# Patient Record
Sex: FEMALE | Race: OTHER | NOT HISPANIC OR LATINO | URBAN - METROPOLITAN AREA
[De-identification: names, ages, dates, MRNs, and addresses within clinical notes are randomized per-mention and may not be internally consistent; named-entity substitution may affect disease eponyms.]

---

## 2020-05-02 ENCOUNTER — EMERGENCY (EMERGENCY)
Facility: HOSPITAL | Age: 40
LOS: 1 days | Discharge: HOME | End: 2020-05-02
Attending: EMERGENCY MEDICINE
Payer: MEDICAID

## 2020-05-02 VITALS
HEART RATE: 80 BPM | OXYGEN SATURATION: 98 % | WEIGHT: 175.05 LBS | DIASTOLIC BLOOD PRESSURE: 74 MMHG | HEIGHT: 64 IN | RESPIRATION RATE: 16 BRPM | TEMPERATURE: 99 F | SYSTOLIC BLOOD PRESSURE: 118 MMHG

## 2020-05-02 VITALS
SYSTOLIC BLOOD PRESSURE: 129 MMHG | DIASTOLIC BLOOD PRESSURE: 73 MMHG | RESPIRATION RATE: 18 BRPM | OXYGEN SATURATION: 98 % | HEART RATE: 83 BPM

## 2020-05-02 LAB
APPEARANCE UR: CLEAR — SIGNIFICANT CHANGE UP
BILIRUB UR-MCNC: NEGATIVE — SIGNIFICANT CHANGE UP
BLD GP AB SCN SERPL QL: NEGATIVE — SIGNIFICANT CHANGE UP
COLOR SPEC: SIGNIFICANT CHANGE UP
DIFF PNL FLD: NEGATIVE — SIGNIFICANT CHANGE UP
GLUCOSE UR QL: NEGATIVE — SIGNIFICANT CHANGE UP
HCG SERPL-ACNC: HIGH MIU/ML
KETONES UR-MCNC: NEGATIVE — SIGNIFICANT CHANGE UP
LEUKOCYTE ESTERASE UR-ACNC: NEGATIVE — SIGNIFICANT CHANGE UP
NITRITE UR-MCNC: NEGATIVE — SIGNIFICANT CHANGE UP
PH UR: 7 — SIGNIFICANT CHANGE UP (ref 5–8)
PROT UR-MCNC: SIGNIFICANT CHANGE UP
RH IG SCN BLD-IMP: POSITIVE — SIGNIFICANT CHANGE UP
SP GR SPEC: 1.02 — SIGNIFICANT CHANGE UP (ref 1.01–1.02)
UROBILINOGEN FLD QL: NEGATIVE — SIGNIFICANT CHANGE UP

## 2020-05-02 PROCEDURE — 81003 URINALYSIS AUTO W/O SCOPE: CPT

## 2020-05-02 PROCEDURE — 87086 URINE CULTURE/COLONY COUNT: CPT

## 2020-05-02 PROCEDURE — 86850 RBC ANTIBODY SCREEN: CPT

## 2020-05-02 PROCEDURE — 99285 EMERGENCY DEPT VISIT HI MDM: CPT

## 2020-05-02 PROCEDURE — 84702 CHORIONIC GONADOTROPIN TEST: CPT

## 2020-05-02 PROCEDURE — 86901 BLOOD TYPING SEROLOGIC RH(D): CPT

## 2020-05-02 PROCEDURE — 36415 COLL VENOUS BLD VENIPUNCTURE: CPT

## 2020-05-02 PROCEDURE — 76815 OB US LIMITED FETUS(S): CPT

## 2020-05-02 PROCEDURE — 86900 BLOOD TYPING SEROLOGIC ABO: CPT

## 2020-05-02 PROCEDURE — 99284 EMERGENCY DEPT VISIT MOD MDM: CPT

## 2020-05-02 PROCEDURE — 76815 OB US LIMITED FETUS(S): CPT | Mod: 26

## 2020-05-02 RX ORDER — THIAMINE MONONITRATE (VIT B1) 100 MG
100 TABLET ORAL ONCE
Refills: 0 | Status: COMPLETED | OUTPATIENT
Start: 2020-05-02 | End: 2020-05-02

## 2020-05-02 RX ORDER — FOLIC ACID 0.8 MG
1 TABLET ORAL ONCE
Refills: 0 | Status: COMPLETED | OUTPATIENT
Start: 2020-05-02 | End: 2020-05-02

## 2020-05-02 RX ADMIN — Medication 100 MILLIGRAM(S): at 17:40

## 2020-05-02 RX ADMIN — Medication 1 MILLIGRAM(S): at 17:40

## 2020-05-02 NOTE — ED PROVIDER NOTE - NSFOLLOWUPCLINICS_GEN_ALL_ED_FT
[facility]  Gynceology/OB  [address] [place] [address]  Phone: [phone]  Fax:   Follow Up Time:

## 2020-05-02 NOTE — ED PROCEDURE NOTE - PROCEDURE ADDITIONAL DETAILS
Emergency Department Focused Ultrasound performed at patient's bedside.  The complete report can be found in PACS. d

## 2020-05-02 NOTE — ED PROVIDER NOTE - CLINICAL SUMMARY MEDICAL DECISION MAKING FREE TEXT BOX
Tristan: 40 year old female   12 weeks pregnant with vaginal spotting since yesterday. will get labs, u/s, type and screen. patient declined pelvic exam at this time. has f/u appointmetn on monday with obgyn.

## 2020-05-02 NOTE — ED PROVIDER NOTE - PATIENT PORTAL LINK FT
You can access the FollowMyHealth Patient Portal offered by [facility] by registering at the following website: [email or web address]. By joining [facility]’s FollowMyHealth portal, you will also be able to view your health information using other applications (apps) compatible with our system.

## 2020-05-02 NOTE — ED PROVIDER NOTE - NSFOLLOWUPINSTRUCTIONS_ED_ALL_ED_FT
1- Tylenol as needed for pain  2- Start prenatal vitamin daily  3- Follow up with our OBGYN clinic   4- If you have any worsening bleeding. pain, or any other concerns come back to the ER immediately

## 2020-05-02 NOTE — ED PROVIDER NOTE - OBJECTIVE STATEMENT
40 y.o. female  12 weeks pregnant coming in with vaginal bleeding that started yesterday.  Some lower abd cramping but not currently in any pain.  No fevers, no chills.  + passage of some clot material yesterday but that has since resolved.  Not lightheaded or dizzy.  No urinary complaints.  Nothing makes the bleeding better or worse.

## 2020-05-02 NOTE — ED ADULT NURSE NOTE - OBJECTIVE STATEMENT
39y/o F 12weeks pregnant coming to the ED c/o vaginal bleeding. Pt states that starting yesterday pt noticed blood when she wiped after urinating. Pt states that she had a another episode and additional clotts/discharge. Pt states that she had some lower abdominal cramps that "felt like period cramps" but no longer is experiencing them. Pt states that she has her first OBGYN appt on monday but had an US last week. Pt denies any other urinary c/o. Pt denies any CP/SOB/Fever/Chills/N/V/D. IV placed. Labs & urine collected and sent. US done @ bedside. VS stable. Pt given call bell and educated on how to use.

## 2020-05-02 NOTE — ED PROVIDER NOTE - CARE PLAN
Principal Discharge DX:	Vaginal bleeding affecting early pregnancy Principal Discharge DX:	Threatened

## 2020-05-03 LAB
CULTURE RESULTS: SIGNIFICANT CHANGE UP
SPECIMEN SOURCE: SIGNIFICANT CHANGE UP

## 2020-06-26 ENCOUNTER — ASOB RESULT (OUTPATIENT)
Age: 40
End: 2020-06-26

## 2020-06-26 ENCOUNTER — APPOINTMENT (OUTPATIENT)
Dept: ANTEPARTUM | Facility: CLINIC | Age: 40
End: 2020-06-26
Payer: MEDICAID

## 2020-06-26 PROCEDURE — 99201 OFFICE OUTPATIENT NEW 10 MINUTES: CPT | Mod: 25

## 2020-06-26 PROCEDURE — 76811 OB US DETAILED SNGL FETUS: CPT

## 2020-07-06 PROBLEM — Z00.00 ENCOUNTER FOR PREVENTIVE HEALTH EXAMINATION: Status: ACTIVE | Noted: 2020-07-06

## 2020-07-07 ENCOUNTER — APPOINTMENT (OUTPATIENT)
Dept: PEDIATRIC CARDIOLOGY | Facility: CLINIC | Age: 40
End: 2020-07-07
Payer: MEDICAID

## 2020-07-07 PROCEDURE — 76827 ECHO EXAM OF FETAL HEART: CPT

## 2020-07-07 PROCEDURE — 76825 ECHO EXAM OF FETAL HEART: CPT

## 2020-07-07 PROCEDURE — 93325 DOPPLER ECHO COLOR FLOW MAPG: CPT

## 2020-07-07 PROCEDURE — 99201 OFFICE OUTPATIENT NEW 10 MINUTES: CPT | Mod: 25

## 2020-08-21 ENCOUNTER — ASOB RESULT (OUTPATIENT)
Age: 40
End: 2020-08-21

## 2020-08-21 ENCOUNTER — APPOINTMENT (OUTPATIENT)
Dept: ANTEPARTUM | Facility: CLINIC | Age: 40
End: 2020-08-21
Payer: MEDICAID

## 2020-08-21 PROCEDURE — 76819 FETAL BIOPHYS PROFIL W/O NST: CPT

## 2020-08-21 PROCEDURE — 76816 OB US FOLLOW-UP PER FETUS: CPT

## 2020-09-01 ENCOUNTER — APPOINTMENT (OUTPATIENT)
Dept: PEDIATRIC CARDIOLOGY | Facility: CLINIC | Age: 40
End: 2020-09-01
Payer: MEDICAID

## 2020-09-01 PROCEDURE — 93325 DOPPLER ECHO COLOR FLOW MAPG: CPT

## 2020-09-01 PROCEDURE — 76828 ECHO EXAM OF FETAL HEART: CPT

## 2020-09-01 PROCEDURE — 76826 ECHO EXAM OF FETAL HEART: CPT

## 2020-09-01 PROCEDURE — 99213 OFFICE O/P EST LOW 20 MIN: CPT | Mod: 25

## 2020-09-17 ENCOUNTER — APPOINTMENT (OUTPATIENT)
Dept: ANTEPARTUM | Facility: HOSPITAL | Age: 40
End: 2020-09-17

## 2020-09-17 ENCOUNTER — ASOB RESULT (OUTPATIENT)
Age: 40
End: 2020-09-17

## 2020-09-17 ENCOUNTER — APPOINTMENT (OUTPATIENT)
Dept: ANTEPARTUM | Facility: CLINIC | Age: 40
End: 2020-09-17
Payer: MEDICAID

## 2020-09-17 PROCEDURE — 76816 OB US FOLLOW-UP PER FETUS: CPT

## 2020-09-17 PROCEDURE — 76819 FETAL BIOPHYS PROFIL W/O NST: CPT

## 2020-10-15 ENCOUNTER — OUTPATIENT (OUTPATIENT)
Dept: OUTPATIENT SERVICES | Facility: HOSPITAL | Age: 40
LOS: 1 days | End: 2020-10-15

## 2020-10-15 ENCOUNTER — ASOB RESULT (OUTPATIENT)
Age: 40
End: 2020-10-15

## 2020-10-15 ENCOUNTER — APPOINTMENT (OUTPATIENT)
Dept: ANTEPARTUM | Facility: CLINIC | Age: 40
End: 2020-10-15
Payer: MEDICAID

## 2020-10-15 PROCEDURE — 76818 FETAL BIOPHYS PROFILE W/NST: CPT | Mod: 26

## 2020-10-15 PROCEDURE — 76816 OB US FOLLOW-UP PER FETUS: CPT

## 2020-10-28 ENCOUNTER — OUTPATIENT (OUTPATIENT)
Dept: OUTPATIENT SERVICES | Facility: HOSPITAL | Age: 40
LOS: 1 days | End: 2020-10-28

## 2020-10-28 VITALS
HEIGHT: 64 IN | TEMPERATURE: 97 F | HEART RATE: 84 BPM | DIASTOLIC BLOOD PRESSURE: 73 MMHG | RESPIRATION RATE: 16 BRPM | SYSTOLIC BLOOD PRESSURE: 120 MMHG | OXYGEN SATURATION: 99 % | WEIGHT: 203.93 LBS

## 2020-10-28 DIAGNOSIS — Z30.9 ENCOUNTER FOR CONTRACEPTIVE MANAGEMENT, UNSPECIFIED: ICD-10-CM

## 2020-10-28 DIAGNOSIS — Z34.90 ENCOUNTER FOR SUPERVISION OF NORMAL PREGNANCY, UNSPECIFIED, UNSPECIFIED TRIMESTER: ICD-10-CM

## 2020-10-28 LAB
APPEARANCE UR: SIGNIFICANT CHANGE UP
BACTERIA # UR AUTO: NEGATIVE — SIGNIFICANT CHANGE UP
BILIRUB UR-MCNC: NEGATIVE — SIGNIFICANT CHANGE UP
BLD GP AB SCN SERPL QL: NEGATIVE — SIGNIFICANT CHANGE UP
BLOOD UR QL VISUAL: NEGATIVE — SIGNIFICANT CHANGE UP
COLOR SPEC: YELLOW — SIGNIFICANT CHANGE UP
GLUCOSE UR-MCNC: NEGATIVE — SIGNIFICANT CHANGE UP
HCT VFR BLD CALC: 37.8 % — SIGNIFICANT CHANGE UP (ref 34.5–45)
HGB BLD-MCNC: 12 G/DL — SIGNIFICANT CHANGE UP (ref 11.5–15.5)
HYALINE CASTS # UR AUTO: NEGATIVE — SIGNIFICANT CHANGE UP
KETONES UR-MCNC: NEGATIVE — SIGNIFICANT CHANGE UP
LEUKOCYTE ESTERASE UR-ACNC: NEGATIVE — SIGNIFICANT CHANGE UP
MCHC RBC-ENTMCNC: 27.4 PG — SIGNIFICANT CHANGE UP (ref 27–34)
MCHC RBC-ENTMCNC: 31.7 % — LOW (ref 32–36)
MCV RBC AUTO: 86.3 FL — SIGNIFICANT CHANGE UP (ref 80–100)
NITRITE UR-MCNC: NEGATIVE — SIGNIFICANT CHANGE UP
NRBC # FLD: 0 K/UL — SIGNIFICANT CHANGE UP (ref 0–0)
PH UR: 7 — SIGNIFICANT CHANGE UP (ref 5–8)
PLATELET # BLD AUTO: 183 K/UL — SIGNIFICANT CHANGE UP (ref 150–400)
PMV BLD: 10.7 FL — SIGNIFICANT CHANGE UP (ref 7–13)
PROT UR-MCNC: 10 — SIGNIFICANT CHANGE UP
RBC # BLD: 4.38 M/UL — SIGNIFICANT CHANGE UP (ref 3.8–5.2)
RBC # FLD: 15 % — HIGH (ref 10.3–14.5)
RBC CASTS # UR COMP ASSIST: SIGNIFICANT CHANGE UP (ref 0–?)
RH IG SCN BLD-IMP: POSITIVE — SIGNIFICANT CHANGE UP
SP GR SPEC: 1.02 — SIGNIFICANT CHANGE UP (ref 1–1.04)
SQUAMOUS # UR AUTO: SIGNIFICANT CHANGE UP
UROBILINOGEN FLD QL: NORMAL — SIGNIFICANT CHANGE UP
WBC # BLD: 8.88 K/UL — SIGNIFICANT CHANGE UP (ref 3.8–10.5)
WBC # FLD AUTO: 8.88 K/UL — SIGNIFICANT CHANGE UP (ref 3.8–10.5)
WBC UR QL: SIGNIFICANT CHANGE UP (ref 0–?)

## 2020-10-28 NOTE — OB PST NOTE - PROBLEM SELECTOR PLAN 1
scheduled for repeat  section, bilateral tubal ligation with Dr. Alegria on 2020.  Preoperative instructions reviewed. Surgical scrub provided. No food after midnight, clear liquids up to 2.5 hours prior to procedure.. Pending labs Type & screen, CBC, UA.

## 2020-10-28 NOTE — OB PST NOTE - NSANTHOSAYNRD_GEN_A_CORE
No. BAILEE screening performed.  STOP BANG Legend: 0-2 = LOW Risk; 3-4 = INTERMEDIATE Risk; 5-8 = HIGH Risk

## 2020-10-28 NOTE — OB PST NOTE - ASSESSMENT
41 yo female with no significant pmh presents to PST unit scheduled for repeat  section, bilateral tubal ligation with Dr. Alegria. REGINO 2020.

## 2020-10-28 NOTE — OB PST NOTE - VISION (WITH CORRECTIVE LENSES IF THE PATIENT USUALLY WEARS THEM):
[FreeTextEntry1] : Ms Prasad is a 52 Normal vision: sees adequately in most situations; can see medication labels, newsprint

## 2020-10-28 NOTE — OB PST NOTE - NSHPREVIEWOFSYSTEMS_GEN_ALL_CORE
General: No fever, chills, sweating, anorexia, weight loss or weight gain. No polyphagia, polyurea, polydypsia, malaise, or fatigue    Skin: No rashes, itching, or dryness. No change in size/color of moles. No tumors, brittle nails, pitted nails, or hair loss    Breast: No tenderness, lumps, or nipple discharge      Ophthalmologic: No diplopia, photophobia, lacrimation, blurred Vision , or eye discharge    ENMT Symptoms: No hearing difficulty, ear pain, tinnitus, or vertigo. No sinus symptoms, nasal congestion, nasal   discharge, or nasal obstruction    Respiratory and Thorax: No wheezing, dyspnea, cough, hemoptysis, or pleuritic chest pPain     Cardiovascular: No chest pain, palpitations, dyspnea on exertion, orthopnea, paroxysmal nocturnal dyspnea,   peripheral edema, or claudication    Gastrointestinal: Constipation & Occasional nausea. Denies vomiting, diarrhea, change in bowel habits, flatulence, abdominal pain, or melena    Genitourinary/ Pelvis: No hematuria, renal colic, or flank pain.  No urine discoloration, incontinence, dysuria, or urinary hesitancy. Normal urinary frequency. No nocturia, abnormal vaginal bleeding, vaginal discharge, spotting, pelvic pain, or vaginal leakage    Musculoskeletal: Lower back pain  No arthralgia, arthritis, joint swelling, muscle cramping, muscle weakness, neck pain, arm pain, or leg pain    Neurological: No transient paralysis, weakness, paresthesias, or seizures. No syncope, tremors, vertigo, loss of sensation, difficulty walking, loss of consciousness, hemiparesis, confusion, or facial palsy    Psychiatric: No suicidal ideation, depression, anxiety, insomnia, memory loss, paranoia, mood swings, agitation, hallucinations, or hyperactivity    Hematology: No gum bleeding, nose bleeding, or skin lumps    Lymphatic: No enlarged or tender lymph nodes. No extremity swelling    Endocrine: No heat or cold intolerance    Immunologic: No recurrent or persistent infections

## 2020-10-28 NOTE — OB PST NOTE - HISTORY OF PRESENT ILLNESS
41 yo female with no significant pmh presents to PST unit scheduled for repeat  section, bilateral tubal ligation with Dr. Alegria. REGINO 2020. Pt. reports positive fetal movement, denies loss of fluid, vaginal bleeding or painful uterine contractions.

## 2020-10-30 PROBLEM — Z34.90 ENCOUNTER FOR SUPERVISION OF NORMAL PREGNANCY, UNSPECIFIED, UNSPECIFIED TRIMESTER: Chronic | Status: ACTIVE | Noted: 2020-10-28

## 2020-10-31 ENCOUNTER — TRANSCRIPTION ENCOUNTER (OUTPATIENT)
Age: 40
End: 2020-10-31

## 2020-11-01 ENCOUNTER — RESULT REVIEW (OUTPATIENT)
Age: 40
End: 2020-11-01

## 2020-11-01 ENCOUNTER — INPATIENT (INPATIENT)
Facility: HOSPITAL | Age: 40
LOS: 1 days | Discharge: HOME | End: 2020-11-03
Attending: OBSTETRICS & GYNECOLOGY | Admitting: OBSTETRICS & GYNECOLOGY
Payer: MEDICAID

## 2020-11-01 VITALS
DIASTOLIC BLOOD PRESSURE: 73 MMHG | HEART RATE: 85 BPM | SYSTOLIC BLOOD PRESSURE: 116 MMHG | TEMPERATURE: 99 F | RESPIRATION RATE: 16 BRPM

## 2020-11-01 DIAGNOSIS — Z01.818 ENCOUNTER FOR OTHER PREPROCEDURAL EXAMINATION: ICD-10-CM

## 2020-11-01 DIAGNOSIS — O26.899 OTHER SPECIFIED PREGNANCY RELATED CONDITIONS, UNSPECIFIED TRIMESTER: ICD-10-CM

## 2020-11-01 DIAGNOSIS — Z3A.00 WEEKS OF GESTATION OF PREGNANCY NOT SPECIFIED: ICD-10-CM

## 2020-11-01 DIAGNOSIS — O34.219 MATERNAL CARE FOR UNSPECIFIED TYPE SCAR FROM PREVIOUS CESAREAN DELIVERY: ICD-10-CM

## 2020-11-01 DIAGNOSIS — O42.90 PREMATURE RUPTURE OF MEMBRANES, UNSPECIFIED AS TO LENGTH OF TIME BETWEEN RUPTURE AND ONSET OF LABOR, UNSPECIFIED WEEKS OF GESTATION: ICD-10-CM

## 2020-11-01 LAB
BASOPHILS # BLD AUTO: 0.02 K/UL — SIGNIFICANT CHANGE UP (ref 0–0.2)
BASOPHILS NFR BLD AUTO: 0.2 % — SIGNIFICANT CHANGE UP (ref 0–2)
BLD GP AB SCN SERPL QL: NEGATIVE — SIGNIFICANT CHANGE UP
EOSINOPHIL # BLD AUTO: 0.08 K/UL — SIGNIFICANT CHANGE UP (ref 0–0.5)
EOSINOPHIL NFR BLD AUTO: 0.9 % — SIGNIFICANT CHANGE UP (ref 0–6)
HCT VFR BLD CALC: 40.3 % — SIGNIFICANT CHANGE UP (ref 34.5–45)
HGB BLD-MCNC: 12.8 G/DL — SIGNIFICANT CHANGE UP (ref 11.5–15.5)
IMM GRANULOCYTES NFR BLD AUTO: 1.2 % — SIGNIFICANT CHANGE UP (ref 0–1.5)
LYMPHOCYTES # BLD AUTO: 1.87 K/UL — SIGNIFICANT CHANGE UP (ref 1–3.3)
LYMPHOCYTES # BLD AUTO: 20.1 % — SIGNIFICANT CHANGE UP (ref 13–44)
MCHC RBC-ENTMCNC: 27.2 PG — SIGNIFICANT CHANGE UP (ref 27–34)
MCHC RBC-ENTMCNC: 31.8 % — LOW (ref 32–36)
MCV RBC AUTO: 85.7 FL — SIGNIFICANT CHANGE UP (ref 80–100)
MONOCYTES # BLD AUTO: 0.56 K/UL — SIGNIFICANT CHANGE UP (ref 0–0.9)
MONOCYTES NFR BLD AUTO: 6 % — SIGNIFICANT CHANGE UP (ref 2–14)
NEUTROPHILS # BLD AUTO: 6.67 K/UL — SIGNIFICANT CHANGE UP (ref 1.8–7.4)
NEUTROPHILS NFR BLD AUTO: 71.6 % — SIGNIFICANT CHANGE UP (ref 43–77)
NRBC # FLD: 0 K/UL — SIGNIFICANT CHANGE UP (ref 0–0)
PLATELET # BLD AUTO: 168 K/UL — SIGNIFICANT CHANGE UP (ref 150–400)
PMV BLD: 10.4 FL — SIGNIFICANT CHANGE UP (ref 7–13)
RBC # BLD: 4.7 M/UL — SIGNIFICANT CHANGE UP (ref 3.8–5.2)
RBC # FLD: 14.9 % — HIGH (ref 10.3–14.5)
RH IG SCN BLD-IMP: POSITIVE — SIGNIFICANT CHANGE UP
RH IG SCN BLD-IMP: POSITIVE — SIGNIFICANT CHANGE UP
SARS-COV-2 RNA SPEC QL NAA+PROBE: SIGNIFICANT CHANGE UP
WBC # BLD: 9.31 K/UL — SIGNIFICANT CHANGE UP (ref 3.8–10.5)
WBC # FLD AUTO: 9.31 K/UL — SIGNIFICANT CHANGE UP (ref 3.8–10.5)

## 2020-11-01 PROCEDURE — 88302 TISSUE EXAM BY PATHOLOGIST: CPT | Mod: 26

## 2020-11-01 RX ORDER — HYDROMORPHONE HYDROCHLORIDE 2 MG/ML
0.5 INJECTION INTRAMUSCULAR; INTRAVENOUS; SUBCUTANEOUS
Refills: 0 | Status: DISCONTINUED | OUTPATIENT
Start: 2020-11-01 | End: 2020-11-01

## 2020-11-01 RX ORDER — HYDROMORPHONE HYDROCHLORIDE 2 MG/ML
1 INJECTION INTRAMUSCULAR; INTRAVENOUS; SUBCUTANEOUS
Refills: 0 | Status: DISCONTINUED | OUTPATIENT
Start: 2020-11-01 | End: 2020-11-01

## 2020-11-01 RX ORDER — MAGNESIUM HYDROXIDE 400 MG/1
30 TABLET, CHEWABLE ORAL
Refills: 0 | Status: DISCONTINUED | OUTPATIENT
Start: 2020-11-01 | End: 2020-11-03

## 2020-11-01 RX ORDER — LANOLIN
1 OINTMENT (GRAM) TOPICAL EVERY 6 HOURS
Refills: 0 | Status: DISCONTINUED | OUTPATIENT
Start: 2020-11-01 | End: 2020-11-01

## 2020-11-01 RX ORDER — BUTORPHANOL TARTRATE 2 MG/ML
0.12 INJECTION, SOLUTION INTRAMUSCULAR; INTRAVENOUS EVERY 6 HOURS
Refills: 0 | Status: DISCONTINUED | OUTPATIENT
Start: 2020-11-01 | End: 2020-11-01

## 2020-11-01 RX ORDER — ACETAMINOPHEN 500 MG
975 TABLET ORAL EVERY 6 HOURS
Refills: 0 | Status: DISCONTINUED | OUTPATIENT
Start: 2020-11-01 | End: 2020-11-01

## 2020-11-01 RX ORDER — OXYCODONE HYDROCHLORIDE 5 MG/1
5 TABLET ORAL
Refills: 0 | Status: DISCONTINUED | OUTPATIENT
Start: 2020-11-01 | End: 2020-11-01

## 2020-11-01 RX ORDER — OXYTOCIN 10 UNIT/ML
333.33 VIAL (ML) INJECTION
Qty: 20 | Refills: 0 | Status: DISCONTINUED | OUTPATIENT
Start: 2020-11-01 | End: 2020-11-01

## 2020-11-01 RX ORDER — OXYCODONE HYDROCHLORIDE 5 MG/1
5 TABLET ORAL
Refills: 0 | Status: DISCONTINUED | OUTPATIENT
Start: 2020-11-01 | End: 2020-11-03

## 2020-11-01 RX ORDER — LANOLIN
1 OINTMENT (GRAM) TOPICAL EVERY 6 HOURS
Refills: 0 | Status: DISCONTINUED | OUTPATIENT
Start: 2020-11-01 | End: 2020-11-03

## 2020-11-01 RX ORDER — SODIUM CHLORIDE 9 MG/ML
1000 INJECTION, SOLUTION INTRAVENOUS
Refills: 0 | Status: DISCONTINUED | OUTPATIENT
Start: 2020-11-01 | End: 2020-11-01

## 2020-11-01 RX ORDER — FERROUS SULFATE 325(65) MG
325 TABLET ORAL DAILY
Refills: 0 | Status: DISCONTINUED | OUTPATIENT
Start: 2020-11-01 | End: 2020-11-03

## 2020-11-01 RX ORDER — OXYCODONE HYDROCHLORIDE 5 MG/1
10 TABLET ORAL
Refills: 0 | Status: DISCONTINUED | OUTPATIENT
Start: 2020-11-01 | End: 2020-11-01

## 2020-11-01 RX ORDER — METOCLOPRAMIDE HCL 10 MG
10 TABLET ORAL ONCE
Refills: 0 | Status: COMPLETED | OUTPATIENT
Start: 2020-11-01 | End: 2020-11-01

## 2020-11-01 RX ORDER — KETOROLAC TROMETHAMINE 30 MG/ML
30 SYRINGE (ML) INJECTION EVERY 6 HOURS
Refills: 0 | Status: DISCONTINUED | OUTPATIENT
Start: 2020-11-01 | End: 2020-11-02

## 2020-11-01 RX ORDER — DIPHENHYDRAMINE HCL 50 MG
25 CAPSULE ORAL EVERY 6 HOURS
Refills: 0 | Status: DISCONTINUED | OUTPATIENT
Start: 2020-11-01 | End: 2020-11-01

## 2020-11-01 RX ORDER — IBUPROFEN 200 MG
600 TABLET ORAL EVERY 6 HOURS
Refills: 0 | Status: COMPLETED | OUTPATIENT
Start: 2020-11-01 | End: 2021-09-30

## 2020-11-01 RX ORDER — NALOXONE HYDROCHLORIDE 4 MG/.1ML
0.1 SPRAY NASAL
Refills: 0 | Status: DISCONTINUED | OUTPATIENT
Start: 2020-11-01 | End: 2020-11-01

## 2020-11-01 RX ORDER — SIMETHICONE 80 MG/1
80 TABLET, CHEWABLE ORAL EVERY 4 HOURS
Refills: 0 | Status: DISCONTINUED | OUTPATIENT
Start: 2020-11-01 | End: 2020-11-03

## 2020-11-01 RX ORDER — TETANUS TOXOID, REDUCED DIPHTHERIA TOXOID AND ACELLULAR PERTUSSIS VACCINE, ADSORBED 5; 2.5; 8; 8; 2.5 [IU]/.5ML; [IU]/.5ML; UG/.5ML; UG/.5ML; UG/.5ML
0.5 SUSPENSION INTRAMUSCULAR ONCE
Refills: 0 | Status: COMPLETED | OUTPATIENT
Start: 2020-11-01

## 2020-11-01 RX ORDER — CITRIC ACID/SODIUM CITRATE 300-500 MG
30 SOLUTION, ORAL ORAL ONCE
Refills: 0 | Status: COMPLETED | OUTPATIENT
Start: 2020-11-01 | End: 2020-11-01

## 2020-11-01 RX ORDER — NALOXONE HYDROCHLORIDE 4 MG/.1ML
0.1 SPRAY NASAL
Refills: 0 | Status: DISCONTINUED | OUTPATIENT
Start: 2020-11-01 | End: 2020-11-03

## 2020-11-01 RX ORDER — ONDANSETRON 8 MG/1
4 TABLET, FILM COATED ORAL EVERY 6 HOURS
Refills: 0 | Status: DISCONTINUED | OUTPATIENT
Start: 2020-11-01 | End: 2020-11-03

## 2020-11-01 RX ORDER — DIPHENHYDRAMINE HCL 50 MG
25 CAPSULE ORAL EVERY 4 HOURS
Refills: 0 | Status: DISCONTINUED | OUTPATIENT
Start: 2020-11-01 | End: 2020-11-01

## 2020-11-01 RX ORDER — SIMETHICONE 80 MG/1
80 TABLET, CHEWABLE ORAL EVERY 4 HOURS
Refills: 0 | Status: DISCONTINUED | OUTPATIENT
Start: 2020-11-01 | End: 2020-11-01

## 2020-11-01 RX ORDER — ACETAMINOPHEN 500 MG
1000 TABLET ORAL EVERY 6 HOURS
Refills: 0 | Status: DISCONTINUED | OUTPATIENT
Start: 2020-11-01 | End: 2020-11-01

## 2020-11-01 RX ORDER — ONDANSETRON 8 MG/1
4 TABLET, FILM COATED ORAL ONCE
Refills: 0 | Status: DISCONTINUED | OUTPATIENT
Start: 2020-11-01 | End: 2020-11-01

## 2020-11-01 RX ORDER — HEPARIN SODIUM 5000 [USP'U]/ML
5000 INJECTION INTRAVENOUS; SUBCUTANEOUS EVERY 12 HOURS
Refills: 0 | Status: DISCONTINUED | OUTPATIENT
Start: 2020-11-01 | End: 2020-11-03

## 2020-11-01 RX ORDER — SODIUM CHLORIDE 9 MG/ML
1000 INJECTION, SOLUTION INTRAVENOUS ONCE
Refills: 0 | Status: COMPLETED | OUTPATIENT
Start: 2020-11-01 | End: 2020-11-01

## 2020-11-01 RX ORDER — OXYTOCIN 10 UNIT/ML
333.33 VIAL (ML) INJECTION
Qty: 20 | Refills: 0 | Status: DISCONTINUED | OUTPATIENT
Start: 2020-11-01 | End: 2020-11-03

## 2020-11-01 RX ORDER — MAGNESIUM HYDROXIDE 400 MG/1
30 TABLET, CHEWABLE ORAL
Refills: 0 | Status: DISCONTINUED | OUTPATIENT
Start: 2020-11-01 | End: 2020-11-01

## 2020-11-01 RX ORDER — TETANUS TOXOID, REDUCED DIPHTHERIA TOXOID AND ACELLULAR PERTUSSIS VACCINE, ADSORBED 5; 2.5; 8; 8; 2.5 [IU]/.5ML; [IU]/.5ML; UG/.5ML; UG/.5ML; UG/.5ML
0.5 SUSPENSION INTRAMUSCULAR ONCE
Refills: 0 | Status: DISCONTINUED | OUTPATIENT
Start: 2020-11-01 | End: 2020-11-01

## 2020-11-01 RX ORDER — DIPHENHYDRAMINE HCL 50 MG
25 CAPSULE ORAL EVERY 6 HOURS
Refills: 0 | Status: DISCONTINUED | OUTPATIENT
Start: 2020-11-01 | End: 2020-11-03

## 2020-11-01 RX ORDER — FAMOTIDINE 10 MG/ML
20 INJECTION INTRAVENOUS ONCE
Refills: 0 | Status: COMPLETED | OUTPATIENT
Start: 2020-11-01 | End: 2020-11-01

## 2020-11-01 RX ORDER — OXYCODONE HYDROCHLORIDE 5 MG/1
5 TABLET ORAL ONCE
Refills: 0 | Status: DISCONTINUED | OUTPATIENT
Start: 2020-11-01 | End: 2020-11-01

## 2020-11-01 RX ORDER — BUTORPHANOL TARTRATE 2 MG/ML
0.12 INJECTION, SOLUTION INTRAMUSCULAR; INTRAVENOUS EVERY 6 HOURS
Refills: 0 | Status: DISCONTINUED | OUTPATIENT
Start: 2020-11-01 | End: 2020-11-03

## 2020-11-01 RX ORDER — ONDANSETRON 8 MG/1
4 TABLET, FILM COATED ORAL EVERY 6 HOURS
Refills: 0 | Status: DISCONTINUED | OUTPATIENT
Start: 2020-11-01 | End: 2020-11-01

## 2020-11-01 RX ORDER — MORPHINE SULFATE 50 MG/1
0.15 CAPSULE, EXTENDED RELEASE ORAL ONCE
Refills: 0 | Status: DISCONTINUED | OUTPATIENT
Start: 2020-11-01 | End: 2020-11-01

## 2020-11-01 RX ORDER — DIPHENHYDRAMINE HCL 50 MG
25 CAPSULE ORAL EVERY 4 HOURS
Refills: 0 | Status: DISCONTINUED | OUTPATIENT
Start: 2020-11-01 | End: 2020-11-03

## 2020-11-01 RX ORDER — ACETAMINOPHEN 500 MG
975 TABLET ORAL
Refills: 0 | Status: DISCONTINUED | OUTPATIENT
Start: 2020-11-01 | End: 2020-11-03

## 2020-11-01 RX ORDER — SODIUM CHLORIDE 9 MG/ML
1000 INJECTION, SOLUTION INTRAVENOUS
Refills: 0 | Status: DISCONTINUED | OUTPATIENT
Start: 2020-11-01 | End: 2020-11-02

## 2020-11-01 RX ORDER — OXYCODONE HYDROCHLORIDE 5 MG/1
5 TABLET ORAL ONCE
Refills: 0 | Status: DISCONTINUED | OUTPATIENT
Start: 2020-11-01 | End: 2020-11-03

## 2020-11-01 RX ADMIN — SODIUM CHLORIDE 2000 MILLILITER(S): 9 INJECTION, SOLUTION INTRAVENOUS at 13:45

## 2020-11-01 RX ADMIN — Medication 10 MILLIGRAM(S): at 23:51

## 2020-11-01 RX ADMIN — Medication 30 MILLILITER(S): at 13:45

## 2020-11-01 RX ADMIN — SODIUM CHLORIDE 75 MILLILITER(S): 9 INJECTION, SOLUTION INTRAVENOUS at 18:38

## 2020-11-01 RX ADMIN — SODIUM CHLORIDE 125 MILLILITER(S): 9 INJECTION, SOLUTION INTRAVENOUS at 23:50

## 2020-11-01 RX ADMIN — Medication 1000 MILLIUNIT(S)/MIN: at 18:37

## 2020-11-01 RX ADMIN — FAMOTIDINE 20 MILLIGRAM(S): 10 INJECTION INTRAVENOUS at 13:45

## 2020-11-01 RX ADMIN — Medication 10 MILLIGRAM(S): at 13:45

## 2020-11-01 NOTE — OB PROVIDER TRIAGE NOTE - NSHPPHYSICALEXAM_GEN_ALL_CORE
Gen: A&O x 3; NAD  Vitals: BP-116/73; P-85; T-37.3    Pulm-CTA B/L; no wheezes  Cor-clear S1S2  Abd exam-soft and nontender    VE-0.5/80/-2; grossly ruptured with clear fluid    NST cat I with 135 baseline with accels and mod variability; infreq ctx's

## 2020-11-01 NOTE — OB RN PATIENT PROFILE - NSALCOHOLUSECOMMENT_GEN_ALL_CORE_FT
Same Day Surgery Discharge Instructions for  Sedation and General Anesthesia       It's not unusual to feel dizzy, light-headed or faint for up to 24 hours after surgery or while taking pain medication.  If you have these symptoms: sit for a few minutes before standing and have someone assist you when you get up to walk or use the bathroom.      You should rest and relax for the next 24 hours. We recommend you make arrangements to have an adult stay with you for at least 24 hours after your discharge.  Avoid hazardous and strenuous activity.      DO NOT DRIVE any vehicle or operate mechanical equipment for 24 hours following the end of your surgery.  Even though you may feel normal, your reactions may be affected by the medication you have received.      Do not drink alcoholic beverages for 24 hours following surgery.       Slowly progress to your regular diet as you feel able. It's not unusual to feel nauseated and/or vomit after receiving anesthesia.  If you develop these symptoms, drink clear liquids (apple juice, ginger ale, broth, 7-up, etc. ) until you feel better.  If your nausea and vomiting persists for 24 hours, please notify your surgeon.        All narcotic pain medications, along with inactivity and anesthesia, can cause constipation. Drinking plenty of liquids and increasing fiber intake will help.      For any questions of a medical nature, call your surgeon.      Do not make important decisions for 24 hours.      If you had general anesthesia, you may have a sore throat for a couple of days related to the breathing tube used during surgery.  You may use Cepacol lozenges to help with this discomfort.  If it worsens or if you develop a fever, contact your surgeon.       If you feel your pain is not well managed with the pain medications prescribed by your surgeon, please contact your surgeon's office to let them know so they can address your concerns.       CoVid 19 Information    We want to give you  information regarding Covid. Please consult your primary care provider with any questions you might have.     Patient who have symptoms (cough, fever, or shortness of breath), need to isolate for 7 days from when symptoms started OR 72 hours after fever resolves (without fever reducing medications) AND improvement of respiratory symptoms (whichever is longer).      Isolate yourself at home (in own room/own bathroom if possible)    Do Not allow any visitors    Do Not go to work or school    Do Not go to Christianity,  centers, shopping, or other public places.    Do Not shake hands.    Avoid close and intimate contact with others (hugging, kissing).    Follow CDC recommendations for household cleaning of frequently touched services.     After the initial 7 days, continue to isolate yourself from household members as much as possible. To continue decrease the risk of community spread and exposure, you and any members of your household should limit activities in public for 14 days after starting home isolation.     You can reference the following CDC link for helpful home isolation/care tips:  https://www.cdc.gov/coronavirus/2019-ncov/downloads/10Things.pdf    Protect Others:    Cover Your Mouth and Nose with a mask, disposable tissue or wash cloth to avoid spreading germs to others.    Wash your hands and face frequently with soap and water    Call Your Primary Doctor If: Breathing difficulty develops or you become worse.    For more information about COVID19 and options for caring for yourself at home, please visit the CDC website at https://www.cdc.gov/coronavirus/2019-ncov/about/steps-when-sick.html  For more options for care at [facility], please visit our website at [email or web address]          Today you received Toradol, an antiinflammatory medication similar to Ibuprofen.  You should not take other antiinflammatory medication, such as Ibuprofen, Motrin, Advil, Aleve,  Naprosyn, etc until 5:00 PM today.    Reasons to contact your surgeon:    1. Signs of possible infection: Check your incision daily for redness, swelling, warmth, red streaks or foul drainage.   2. Elevated temperature.  3. Pain not controlled with pain medication and/or rest.   4. Uncontrolled nausea or vomiting.  5. Any questions or concerns.      **If you have questions or concerns about your procedure  call Dr Joselito Momin at [phone]**   never during this pregnancy

## 2020-11-01 NOTE — PROVIDER CONTACT NOTE (OTHER) - SITUATION
Patient orthos: (lying down)- 103/52, 74 / (sitting) 109/62, 80, (standing) 97/57, 92. Patient complains of lightheaded when standing. Patient vomited 200ml of light green fluid.

## 2020-11-01 NOTE — OB RN TRIAGE NOTE - PSH
S/P  Section  x 3 2004 nrfht, 2009 failed tolac, 2013 repeat   S/P  Section  x 3    7-0  nrfht,   9-0 failed tolac gdm,  8-0 repeat

## 2020-11-01 NOTE — OB PROVIDER DELIVERY SUMMARY - NSPROVIDERDELIVERYNOTE_OBGYN_ALL_OB_FT
Thin lower uterine segment encountered. Bladder flap created.  Low transverse incision made. Viable baby girl delivered in LOT presentation. Nose and mouth suctioned. Cord clamped and cut. Baby handed to peds. APGARS 9/9. Placenta delivered spontaneously and intact with 3 vessel cord. Uterus delivered. Hysterotomy closed with locking vicril stitch with good hemostasis.  B/l tubes identified. Tubal ligation performed b/l via modified Brandee method w/o complications or bleeding. Portions of the tubes were sent to pathology. All the sites were expected for hemostasis, and excellent hemostasis confirmed. Muscle, sq tissue and skin was closed with suture. Baby and mother in good condition. Transferred to PACU.  MD hien

## 2020-11-01 NOTE — OB RN TRIAGE NOTE - TEMPERATURE IN CELSIUS (DEGREES C)
Encounter Summary
  Created on: 2019
 
 Margaret Ferreira
 External Reference #: [id number]
 : 31
 Sex: Female
 
 Demographics
 
 
+-----------------------+------------------------+
| Address               | [address]      |
|                       | [place]  [address]   |
+-----------------------+------------------------+
| Home Phone            | [phone]        |
+-----------------------+------------------------+
| Preferred Language    | Unknown                |
+-----------------------+------------------------+
| Marital Status        |                 |
+-----------------------+------------------------+
| Anabaptism Affiliation | MET                    |
+-----------------------+------------------------+
| Race                  | White                  |
+-----------------------+------------------------+
| Ethnic Group          | Not  or  |
+-----------------------+------------------------+
 
 
 Author
 
 
+--------------+------------------------------+
| Author       | [facility] |
+--------------+------------------------------+
| Organization | [facility] |
+--------------+------------------------------+
| Address      | Unknown                      |
+--------------+------------------------------+
| Phone        | Unavailable                  |
+--------------+------------------------------+
 
 
 
 Support
 
 
+--------------+--------------+---------------------+-----------------+
| Name         | Relationship | Address             | Phone           |
+--------------+--------------+---------------------+-----------------+
| Lawson Ferreira | ECON         | [address]          | [phone] |
|              |              | [place], [place]   |                 |
|              |              | [address]               |                 |
+--------------+--------------+---------------------+-----------------+
 
 
 
 Care Team Providers
 
 
 
+-----------------------+------+-------------+
| Care Team Member Name | Role | Phone       |
+-----------------------+------+-------------+
 PCP  | Unavailable |
+-----------------------+------+-------------+
 
 
 
 Encounter Details
 
 
+--------+----------+----------------------+-----------+-------------+
| Date   | Type     | Department           | Care Team | Description |
+--------+----------+----------------------+-----------+-------------+
| / | Results  |   Registration  [address] |           |             |
|    | Only     |  [address] |           |             |
|        |          |  [address]  Mailcode: [id number] |           |             |
|        |          |   [place], [place]       |           |             |
|        |          | [address]           |           |             |
|        |          | [phone]         |           |             |
+--------+----------+----------------------+-----------+-------------+
 
 
 
 Social History
 
 
+----------------+-------+-----------+--------+------+
| Tobacco Use    | Types | Packs/Day | Years  | Date |
|                |       |           | Used   |      |
+----------------+-------+-----------+--------+------+
| Never Assessed |       |           |        |      |
+----------------+-------+-----------+--------+------+
 
 
 
+------------------+---------------+
| Sex Assigned at  | Date Recorded |
| Birth            |               |
+------------------+---------------+
| Not on file      |               |
+------------------+---------------+
 
 
 
+----------------+-------------+-------------+
| Job Start Date | Occupation  | Industry    |
+----------------+-------------+-------------+
| Not on file    | Not on file | Not on file |
+----------------+-------------+-------------+
 
 
 
+----------------+--------------+------------+
| Travel History | Travel Start | Travel End |
+----------------+--------------+------------+
 
 
 
 
+-------------------------------------+
| No recent travel history available. |
+-------------------------------------+
 documented as of this encounter
 
 Plan of Treatment
 Not on filedocumented as of this encounter
 
 Procedures
 
 
+----------------------+--------+-------------+----------------------+----------------------
+
| Procedure Name       | Priori | Date/Time   | Associated Diagnosis | Comments             
|
|                      | ty     |             |                      |                      
|
+----------------------+--------+-------------+----------------------+----------------------
+
| MICROBIOLOGY TESTS 1 | Routin | 1996  |                      |   Results for this   
|
|                      | e      |  2:30 PM    |                      | procedure are in the 
|
|                      |        | PST         |                      |  results section.    
|
+----------------------+--------+-------------+----------------------+----------------------
+
| MICROBIOLOGY TESTS 1 | Routin | 1996  |                      |   Results for this   
|
|                      | e      |  2:30 PM    |                      | procedure are in the 
|
|                      |        | PST         |                      |  results section.    
|
+----------------------+--------+-------------+----------------------+----------------------
+
| CBC TESTS 2          | Routin | 1996  |                      |   Results for this   
|
|                      | e      | 12:45 PM    |                      | procedure are in the 
|
|                      |        | PST         |                      |  results section.    
|
+----------------------+--------+-------------+----------------------+----------------------
+
| CHEMISTRY TESTS 4    | Routin | 1996  |                      |   Results for this   
|
|                      | e      |  6:23 AM    |                      | procedure are in the 
|
|                      |        | PST         |                      |  results section.    
|
+----------------------+--------+-------------+----------------------+----------------------
+
| CHEMISTRY TESTS 2    | Routin | 1996  |                      |   Results for this   
|
|                      | e      |  6:23 AM    |                      | procedure are in the 
|
|                      |        | PST         |                      |  results section.    
|
+----------------------+--------+-------------+----------------------+----------------------
+
 
| CHEMISTRY TESTS 4    | Routin | 1996  |                      |   Results for this   
|
|                      | e      |  8:00 PM    |                      | procedure are in the 
|
|                      |        | PST         |                      |  results section.    
|
+----------------------+--------+-------------+----------------------+----------------------
+
| CBC TESTS 2          | Routin | 1996  |                      |   Results for this   
|
|                      | e      |  8:00 PM    |                      | procedure are in the 
|
|                      |        | PST         |                      |  results section.    
|
+----------------------+--------+-------------+----------------------+----------------------
+
| COAGULATION TESTS 2  | Routin | 1996  |                      |   Results for this   
|
|                      | e      |  8:00 PM    |                      | procedure are in the 
|
|                      |        | PST         |                      |  results section.    
|
+----------------------+--------+-------------+----------------------+----------------------
+
| TRANSFUSION MEDICINE | Routin | 1996  |                      |   Results for this   
|
|  TESTS               | e      |  4:55 PM    |                      | procedure are in the 
|
|                      |        | PST         |                      |  results section.    
|
+----------------------+--------+-------------+----------------------+----------------------
+
| CBC TESTS 2          | Routin | 1996  |                      |   Results for this   
|
|                      | e      | 11:50 AM    |                      | procedure are in the 
|
|                      |        | PST         |                      |  results section.    
|
+----------------------+--------+-------------+----------------------+----------------------
+
| CHEMISTRY TESTS 4    | Routin | 1996  |                      |   Results for this   
|
|                      | e      |  7:30 AM    |                      | procedure are in the 
|
|                      |        | PST         |                      |  results section.    
|
+----------------------+--------+-------------+----------------------+----------------------
+
| CBC TESTS 2          | Routin | 1996  |                      |   Results for this   
|
|                      | e      |  7:30 AM    |                      | procedure are in the 
|
|                      |        | PST         |                      |  results section.    
|
+----------------------+--------+-------------+----------------------+----------------------
+
| CHEMISTRY TESTS 2    | Routin | 1996  |                      |   Results for this   
|
|                      | e      |  7:30 AM    |                      | procedure are in the 
|
 
|                      |        | PST         |                      |  results section.    
|
+----------------------+--------+-------------+----------------------+----------------------
+
| CBC TESTS 2          | Routin | 1996  |                      |   Results for this   
|
|                      | e      |  1:50 PM    |                      | procedure are in the 
|
|                      |        | PST         |                      |  results section.    
|
+----------------------+--------+-------------+----------------------+----------------------
+
| CHEMISTRY TESTS 4    | Routin | 1996  |                      |   Results for this   
|
|                      | e      |  5:30 AM    |                      | procedure are in the 
|
|                      |        | PST         |                      |  results section.    
|
+----------------------+--------+-------------+----------------------+----------------------
+
| CBC TESTS 2          | Routin | 1996  |                      |   Results for this   
|
|                      | e      |  5:30 AM    |                      | procedure are in the 
|
|                      |        | PST         |                      |  results section.    
|
+----------------------+--------+-------------+----------------------+----------------------
+
| CHEMISTRY TESTS 2    | Routin | 1996  |                      |   Results for this   
|
|                      | e      |  5:30 AM    |                      | procedure are in the 
|
|                      |        | PST         |                      |  results section.    
|
+----------------------+--------+-------------+----------------------+----------------------
+
| BLOOD GASES,         | Routin | 1996  |                      |   Results for this   
|
| ARTERIAL - LAB       | e      |  5:30 AM    |                      | procedure are in the 
|
|                      |        | PST         |                      |  results section.    
|
+----------------------+--------+-------------+----------------------+----------------------
+
| TRANSFUSION MEDICINE | Routin | 1996  |                      |   Results for this   
|
|  TESTS               | e      | 11:59 PM    |                      | procedure are in the 
|
|                      |        | PST         |                      |  results section.    
|
+----------------------+--------+-------------+----------------------+----------------------
+
| CHEMISTRY TESTS 4    | Routin | 1996  |                      |   Results for this   
|
|                      | e      | 10:00 PM    |                      | procedure are in the 
|
|                      |        | PST         |                      |  results section.    
|
+----------------------+--------+-------------+----------------------+----------------------
+
 
| CBC TESTS 2          | Routin | 1996  |                      |   Results for this   
|
|                      | e      | 10:00 PM    |                      | procedure are in the 
|
|                      |        | PST         |                      |  results section.    
|
+----------------------+--------+-------------+----------------------+----------------------
+
| CHEMISTRY TESTS 4    | Routin | 1996  |                      |   Results for this   
|
|                      | e      |  2:15 PM    |                      | procedure are in the 
|
|                      |        | PST         |                      |  results section.    
|
+----------------------+--------+-------------+----------------------+----------------------
+
| CBC TESTS 2          | Routin | 1996  |                      |   Results for this   
|
|                      | e      |  2:15 PM    |                      | procedure are in the 
|
|                      |        | PST         |                      |  results section.    
|
+----------------------+--------+-------------+----------------------+----------------------
+
 documented in this encounter
 
 Results
 MICROBIOLOGY TESTS 1 (1996  2:30 PM PST)
 
+-----------+--------------------------+-----------+------------+--------------+
| Component | Value                    | Ref Range | Performed  | Pathologist  |
|           |                          |           | At         | Signature    |
+-----------+--------------------------+-----------+------------+--------------+
| CULTURE   | Diagnosis                |           |            |              |
| RESULT    |   RULE OUT INFECTIONTest |           |            |              |
|           |  Ordered          VIRAL  |           |            |              |
|           | CULTURE, HERPES          |           |            |              |
|           | ONLYOrdering Loc         |           |            |              |
|           |             163Spec Set  |           |            |              |
|           | Up Date    Spec Set  |           |            |              |
|           | Up Time    18:17Specimen |           |            |              |
|           |  Type         SWAB       |           |            |              |
|           | ABDOMENReport Status     |           |            |              |
|           |      FINALPrelim Result  |           |            |              |
|           |         NO VIRUS         |           |            |              |
|           | ISOLATED TO DATECulture  |           |            |              |
|           | Result       NO VIRUS    |           |            |              |
|           | ISOLATEDDate Of Final Re |           |            |              |
|           |           [id number]          |           |            |              |
+-----------+--------------------------+-----------+------------+--------------+
 
 
 
+----------+
| Specimen |
+----------+
|          |
+----------+
 
 
 
 
+----------------------+------------------------+--------------------+--------------+
| Performing           | Address                | City/State/Zipcode | Phone Number |
| Organization         |                        |                    |              |
+----------------------+------------------------+--------------------+--------------+
|   [facility] |   [address]  | [place] [address] |              |
|  PATHOLOGY           | [address]                |                    |              |
+----------------------+------------------------+--------------------+--------------+
 MICROBIOLOGY TESTS 1 (1996  2:30 PM PST)
 
+-----------+--------------------------+-----------+------------+--------------+
| Component | Value                    | Ref Range | Performed  | Pathologist  |
|           |                          |           | At         | Signature    |
+-----------+--------------------------+-----------+------------+--------------+
| CULTURE   | Diagnosis                |           |            |              |
| RESULT    |   RULE OUT URINARY TRACT |           |            |              |
|           |  INFECTIONTest Ordered   |           |            |              |
|           |          URINE CULTURE,  |           |            |              |
|           | ROUTINEOrdering Loc      |           |            |              |
|           |                163Spec   |           |            |              |
|           | Set Up Date    Spec  |           |            |              |
|           | Set Up Time              |           |            |              |
|           | 18:17Source Body Site    |           |            |              |
|           |  CLEAN CATCHColony Count |           |            |              |
|           |           25,000 -       |           |            |              |
|           | 49,000 CFU/MLReport      |           |            |              |
|           | Status                   |           |            |              |
|           |   FINALPrelim Result     |           |            |              |
|           |      GRAM POSITIVE       |           |            |              |
|           | GROWTHCulture Result     |           |            |              |
|           |    MIXED GRAM POSITIVE   |           |            |              |
|           | GROWTHDate Of Final Re   |           |            |              |
|           |          [id number]           |           |            |              |
+-----------+--------------------------+-----------+------------+--------------+
 
 
 
+----------+
| Specimen |
+----------+
|          |
+----------+
 
 
 
+----------------------+------------------------+--------------------+--------------+
| Performing           | Address                | City/State/Zipcode | Phone Number |
| Organization         |                        |                    |              |
+----------------------+------------------------+--------------------+--------------+
|   [facility] |   [address]  | [place] [address] |              |
|  PATHOLOGY           | [address]                |                    |              |
+----------------------+------------------------+--------------------+--------------+
 CBC TESTS 2 (1996 12:45 PM PST)
 
+-------------+-----------------+-----------+------------+--------------+
| Component   | Value           | Ref Range | Performed  | Pathologist  |
|             |                 |           | At         | Signature    |
+-------------+-----------------+-----------+------------+--------------+
| WHITE CELL  |        10.5 (H) | K/CU MM   |            |              |
| COUNT       |                 |           |            |              |
 
+-------------+-----------------+-----------+------------+--------------+
| RED CELL    |         4.08    | M/CU MM   |            |              |
| COUNT       |                 |           |            |              |
+-------------+-----------------+-----------+------------+--------------+
| HEMOGLOBIN  |        12.9     | GM/DL     |            |              |
+-------------+-----------------+-----------+------------+--------------+
| HEMATOCRIT  |        36.8 (L) | %         |            |              |
+-------------+-----------------+-----------+------------+--------------+
| MCV         |        90.1     | FL        |            |              |
+-------------+-----------------+-----------+------------+--------------+
| MCH         |        31.5     | PG        |            |              |
+-------------+-----------------+-----------+------------+--------------+
| MCHC        |        34.9 (H) | GM/DL     |            |              |
+-------------+-----------------+-----------+------------+--------------+
| RDW         |        13.2     | %         |            |              |
+-------------+-----------------+-----------+------------+--------------+
| PLATELET    |       577. (H)  | K/CU MM   |            |              |
| COUNT       |                 |           |            |              |
+-------------+-----------------+-----------+------------+--------------+
| MPV         |         6.7 (L) | FL        |            |              |
+-------------+-----------------+-----------+------------+--------------+
 
 
 
+----------+
| Specimen |
+----------+
|          |
+----------+
 
 
 
+----------------------+------------------------+--------------------+--------------+
| Performing           | Address                | City/State/Zipcode | Phone Number |
| Organization         |                        |                    |              |
+----------------------+------------------------+--------------------+--------------+
|   [facility] |   [address]  | [place] [address] |              |
|  PATHOLOGY           | [address]                |                    |              |
+----------------------+------------------------+--------------------+--------------+
 CHEMISTRY TESTS 2 (1996  6:23 AM PST)
 
+-------------+----------------+-----------+------------+--------------+
| Component   | Value          | Ref Range | Performed  | Pathologist  |
|             |                |           | At         | Signature    |
+-------------+----------------+-----------+------------+--------------+
| CHOLESTEROL |       108. (L) | mg/dL     |            |              |
|   (LAB)     |                |           |            |              |
+-------------+----------------+-----------+------------+--------------+
 
 
 
+----------+
| Specimen |
+----------+
|          |
+----------+
 
 
 
+----------------------+------------------------+--------------------+--------------+
 
| Performing           | Address                | City/State/Zipcode | Phone Number |
| Organization         |                        |                    |              |
+----------------------+------------------------+--------------------+--------------+
|   [facility] |   [address]  | [place] [address] |              |
|  PATHOLOGY           | [address]                |                    |              |
+----------------------+------------------------+--------------------+--------------+
 CHEMISTRY TESTS 4 (1996  6:23 AM PST)
 
+-------------+-----------------+-----------+------------+--------------+
| Component   | Value           | Ref Range | Performed  | Pathologist  |
|             |                 |           | At         | Signature    |
+-------------+-----------------+-----------+------------+--------------+
| SODIUM,     |       138.      | mmol/l    |            |              |
| PLASMA      |                 |           |            |              |
| (LAB)       |                 |           |            |              |
+-------------+-----------------+-----------+------------+--------------+
| POTASSIUM,  |         3.5     | mmol/l    |            |              |
| PLASMA      |                 |           |            |              |
| (LAB)       |                 |           |            |              |
+-------------+-----------------+-----------+------------+--------------+
| CHLORIDE,   |       103.      | mmol/l    |            |              |
| PLASMA      |                 |           |            |              |
| (LAB)       |                 |           |            |              |
+-------------+-----------------+-----------+------------+--------------+
| TOTAL CO2,  |        31. (H)  | mmol/l    |            |              |
| PLASMA      |                 |           |            |              |
| (LAB)       |                 |           |            |              |
+-------------+-----------------+-----------+------------+--------------+
| BUN, PLASMA |         7.      | mg/dL     |            |              |
|  (LAB)      |                 |           |            |              |
+-------------+-----------------+-----------+------------+--------------+
| CREATININE  |         0.8     | mg/dL     |            |              |
| PLASMA      |                 |           |            |              |
| (LAB)       |                 |           |            |              |
+-------------+-----------------+-----------+------------+--------------+
| GLUCOSE,    |       108.      | mg/dL     |            |              |
| PLASMA      |                 |           |            |              |
| (LAB)       |                 |           |            |              |
+-------------+-----------------+-----------+------------+--------------+
| CALCIUM,    |         7.9 (L) | mg/dL     |            |              |
| PLASMA      |                 |           |            |              |
| (LAB)       |                 |           |            |              |
+-------------+-----------------+-----------+------------+--------------+
| MAGNESIUM,P |         1.6 (L) | mg/dL     |            |              |
| LASMA       |                 |           |            |              |
+-------------+-----------------+-----------+------------+--------------+
| PHOSPHORUS, |         2.7     | mg/dL     |            |              |
|  PLASMA     |                 |           |            |              |
| (LAB)       |                 |           |            |              |
+-------------+-----------------+-----------+------------+--------------+
| URIC ACID,  |         4.9     | mg/dL     |            |              |
| PLASMA      |                 |           |            |              |
| (LAB)       |                 |           |            |              |
+-------------+-----------------+-----------+------------+--------------+
| AST(SGOT)   |        15.      | U/L       |            |              |
+-------------+-----------------+-----------+------------+--------------+
| ALT (SGPT)  |        15.      | U/L       |            |              |
+-------------+-----------------+-----------+------------+--------------+
| ALK PHOS    |        50.      | U/L       |            |              |
+-------------+-----------------+-----------+------------+--------------+
 
| LD TOTAL,   |       171.      | U/L       |            |              |
| PLASMA      |                 |           |            |              |
+-------------+-----------------+-----------+------------+--------------+
| BILIRUBIN   |         0.2     | mg/dL     |            |              |
| DIRECT      |                 |           |            |              |
+-------------+-----------------+-----------+------------+--------------+
| BILIRUBIN   |         0.9     | mg/dL     |            |              |
| TOTAL       |                 |           |            |              |
+-------------+-----------------+-----------+------------+--------------+
| TOTAL       |         4.3 (L) | GM/DL     |            |              |
| PROTEIN,    |                 |           |            |              |
| PLASMA      |                 |           |            |              |
| (LAB)       |                 |           |            |              |
+-------------+-----------------+-----------+------------+--------------+
| ALBUMIN,    |         2.4 (L) | GM/DL     |            |              |
| PLASMA      |                 |           |            |              |
| (LAB)       |                 |           |            |              |
+-------------+-----------------+-----------+------------+--------------+
 
 
 
+----------+
| Specimen |
+----------+
|          |
+----------+
 
 
 
+----------------------+------------------------+--------------------+--------------+
| Performing           | Address                | City/State/Zipcode | Phone Number |
| Organization         |                        |                    |              |
+----------------------+------------------------+--------------------+--------------+
|   [facility] |   [address]  | [place] [address] |              |
|  PATHOLOGY           | [address]                |                    |              |
+----------------------+------------------------+--------------------+--------------+
 CHEMISTRY TESTS 4 (1996  8:00 PM PST)
 
+-------------+-----------------+-----------+------------+--------------+
| Component   | Value           | Ref Range | Performed  | Pathologist  |
|             |                 |           | At         | Signature    |
+-------------+-----------------+-----------+------------+--------------+
| SODIUM,     |       141.      | mmol/l    |            |              |
| PLASMA      |                 |           |            |              |
| (LAB)       |                 |           |            |              |
+-------------+-----------------+-----------+------------+--------------+
| POTASSIUM,  |         3.4 (L) | mmol/l    |            |              |
| PLASMA      |                 |           |            |              |
| (LAB)       |                 |           |            |              |
+-------------+-----------------+-----------+------------+--------------+
| CHLORIDE,   |        98.      | mmol/l    |            |              |
| PLASMA      |                 |           |            |              |
| (LAB)       |                 |           |            |              |
+-------------+-----------------+-----------+------------+--------------+
| TOTAL CO2,  |        30. (H)  | mmol/l    |            |              |
| PLASMA      |                 |           |            |              |
| (LAB)       |                 |           |            |              |
+-------------+-----------------+-----------+------------+--------------+
| BUN, PLASMA |         5. (L)  | mg/dL     |            |              |
|  (LAB)      |                 |           |            |              |
 
+-------------+-----------------+-----------+------------+--------------+
| CREATININE  |         0.7     | mg/dL     |            |              |
| PLASMA      |                 |           |            |              |
| (LAB)       |                 |           |            |              |
+-------------+-----------------+-----------+------------+--------------+
| GLUCOSE,    |       118. (H)  | mg/dL     |            |              |
| PLASMA      |                 |           |            |              |
| (LAB)       |                 |           |            |              |
+-------------+-----------------+-----------+------------+--------------+
 
 
 
+----------+
| Specimen |
+----------+
|          |
+----------+
 
 
 
+----------------------+------------------------+--------------------+--------------+
| Performing           | Address                | City/State/Zipcode | Phone Number |
| Organization         |                        |                    |              |
+----------------------+------------------------+--------------------+--------------+
|   [facility] |   [address]  | [place] [address] |              |
|  PATHOLOGY           | [address]                |                    |              |
+----------------------+------------------------+--------------------+--------------+
 COAGULATION TESTS 2 (1996  8:00 PM PST)
 
+-------------+--------------+-----------+------------+--------------+
| Component   | Value        | Ref Range | Performed  | Pathologist  |
|             |              |           | At         | Signature    |
+-------------+--------------+-----------+------------+--------------+
| PROTHROMBIN |        12.3  | SECONDS   |            |              |
|  TIME       |              |           |            |              |
+-------------+--------------+-----------+------------+--------------+
| PROTIME     |         1.   | SECONDS   |            |              |
| RATIO       |              |           |            |              |
+-------------+--------------+-----------+------------+--------------+
| PROTHROMBIN |         1.09 | INR       |            |              |
|  INR        |              |           |            |              |
+-------------+--------------+-----------+------------+--------------+
| APTT        |        24.2  | SECONDS   |            |              |
+-------------+--------------+-----------+------------+--------------+
 
 
 
+----------+
| Specimen |
+----------+
|          |
+----------+
 
 
 
+----------------------+------------------------+--------------------+--------------+
| Performing           | Address                | City/State/Zipcode | Phone Number |
| Organization         |                        |                    |              |
+----------------------+------------------------+--------------------+--------------+
|   [facility] |   [address]  | [place] [address] |              |
 
|  PATHOLOGY           | [address]                |                    |              |
+----------------------+------------------------+--------------------+--------------+
 CBC TESTS 2 (1996  8:00 PM PST)
 
+-------------+-----------------+-----------+------------+--------------+
| Component   | Value           | Ref Range | Performed  | Pathologist  |
|             |                 |           | At         | Signature    |
+-------------+-----------------+-----------+------------+--------------+
| WHITE CELL  |         9.4     | K/CU MM   |            |              |
| COUNT       |                 |           |            |              |
+-------------+-----------------+-----------+------------+--------------+
| RED CELL    |         3.6 (L) | M/CU MM   |            |              |
| COUNT       |                 |           |            |              |
+-------------+-----------------+-----------+------------+--------------+
| HEMOGLOBIN  |        11.3 (L) | GM/DL     |            |              |
+-------------+-----------------+-----------+------------+--------------+
| HEMATOCRIT  |        31.9 (L) | %         |            |              |
+-------------+-----------------+-----------+------------+--------------+
| MCV         |        88.8     | FL        |            |              |
+-------------+-----------------+-----------+------------+--------------+
| MCH         |        31.3     | PG        |            |              |
+-------------+-----------------+-----------+------------+--------------+
| MCHC        |        35.3 (H) | GM/DL     |            |              |
+-------------+-----------------+-----------+------------+--------------+
| RDW         |        13.3     | %         |            |              |
+-------------+-----------------+-----------+------------+--------------+
| PLATELET    |       146. (L)  | K/CU MM   |            |              |
| COUNT       |                 |           |            |              |
+-------------+-----------------+-----------+------------+--------------+
| MPV         |         7.5     | FL        |            |              |
+-------------+-----------------+-----------+------------+--------------+
 
 
 
+----------+
| Specimen |
+----------+
|          |
+----------+
 
 
 
+----------------------+------------------------+--------------------+--------------+
| Performing           | Address                | City/State/Zipcode | Phone Number |
| Organization         |                        |                    |              |
+----------------------+------------------------+--------------------+--------------+
|   [facility] |   [address]  | [place] [address] |              |
|  PATHOLOGY           | [address]                |                    |              |
+----------------------+------------------------+--------------------+--------------+
 TRANSFUSION MEDICINE TESTS (1996  4:55 PM PST)
 
+-----------+----------+-----------+------------+--------------+
| Component | Value    | Ref Range | Performed  | Pathologist  |
|           |          |           | At         | Signature    |
+-----------+----------+-----------+------------+--------------+
| ABO GROUP | A        |           |            |              |
+-----------+----------+-----------+------------+--------------+
| RH TYPE   | POS      |           |            |              |
+-----------+----------+-----------+------------+--------------+
| ANTIBODY  | NEGATIVE |           |            |              |
 
| SCREEN    |          |           |            |              |
+-----------+----------+-----------+------------+--------------+
 
 
 
+----------+
| Specimen |
+----------+
|          |
+----------+
 
 
 
+----------------------+------------------------+--------------------+--------------+
| Performing           | Address                | City/State/Zipcode | Phone Number |
| Organization         |                        |                    |              |
+----------------------+------------------------+--------------------+--------------+
|   [facility] |   [address]  | [place] [address] |              |
|  PATHOLOGY           | [address]                |                    |              |
+----------------------+------------------------+--------------------+--------------+
 CBC TESTS 2 (1996 11:50 AM PST)
 
+-------------+------------------+-----------+------------+--------------+
| Component   | Value            | Ref Range | Performed  | Pathologist  |
|             |                  |           | At         | Signature    |
+-------------+------------------+-----------+------------+--------------+
| WHITE CELL  |         9.9      | K/CU MM   |            |              |
| COUNT       |                  |           |            |              |
+-------------+------------------+-----------+------------+--------------+
| RED CELL    |         3.73 (L) | M/CU MM   |            |              |
| COUNT       |                  |           |            |              |
+-------------+------------------+-----------+------------+--------------+
| HEMOGLOBIN  |        11.8 (L)  | GM/DL     |            |              |
+-------------+------------------+-----------+------------+--------------+
| HEMATOCRIT  |        33.2 (L)  | %         |            |              |
+-------------+------------------+-----------+------------+--------------+
| MCV         |        88.8      | FL        |            |              |
+-------------+------------------+-----------+------------+--------------+
| MCH         |        31.7      | PG        |            |              |
+-------------+------------------+-----------+------------+--------------+
| MCHC        |        35.6 (H)  | GM/DL     |            |              |
+-------------+------------------+-----------+------------+--------------+
| RDW         |        13.3      | %         |            |              |
+-------------+------------------+-----------+------------+--------------+
| PLATELET    |       137. (L)   | K/CU MM   |            |              |
| COUNT       |                  |           |            |              |
+-------------+------------------+-----------+------------+--------------+
| MPV         |         7.7      | FL        |            |              |
+-------------+------------------+-----------+------------+--------------+
 
 
 
+----------+
| Specimen |
+----------+
|          |
+----------+
 
 
 
 
+----------------------+------------------------+--------------------+--------------+
| Performing           | Address                | City/State/Zipcode | Phone Number |
| Organization         |                        |                    |              |
+----------------------+------------------------+--------------------+--------------+
|   [facility] |   [address]  | [place] [address] |              |
|  PATHOLOGY           | [address]                |                    |              |
+----------------------+------------------------+--------------------+--------------+
 CHEMISTRY TESTS 2 (1996  7:30 AM PST)
 
+-------------+----------------+-----------+------------+--------------+
| Component   | Value          | Ref Range | Performed  | Pathologist  |
|             |                |           | At         | Signature    |
+-------------+----------------+-----------+------------+--------------+
| CHOLESTEROL |       101. (L) | mg/dL     |            |              |
|   (LAB)     |                |           |            |              |
+-------------+----------------+-----------+------------+--------------+
 
 
 
+----------+
| Specimen |
+----------+
|          |
+----------+
 
 
 
+----------------------+------------------------+--------------------+--------------+
| Performing           | Address                | City/State/Zipcode | Phone Number |
| Organization         |                        |                    |              |
+----------------------+------------------------+--------------------+--------------+
|   [facility] |   [address]  | [place] [address] |              |
|  PATHOLOGY           | [address]                |                    |              |
+----------------------+------------------------+--------------------+--------------+
 CHEMISTRY TESTS 4 (1996  7:30 AM PST)
 
+-------------+-----------------+-----------+------------+--------------+
| Component   | Value           | Ref Range | Performed  | Pathologist  |
|             |                 |           | At         | Signature    |
+-------------+-----------------+-----------+------------+--------------+
| SODIUM,     |       137.      | mmol/l    |            |              |
| PLASMA      |                 |           |            |              |
| (LAB)       |                 |           |            |              |
+-------------+-----------------+-----------+------------+--------------+
| POTASSIUM,  |         3. (L)  | mmol/l    |            |              |
| PLASMA      |                 |           |            |              |
| (LAB)       |                 |           |            |              |
+-------------+-----------------+-----------+------------+--------------+
| CHLORIDE,   |       102.      | mmol/l    |            |              |
| PLASMA      |                 |           |            |              |
| (LAB)       |                 |           |            |              |
+-------------+-----------------+-----------+------------+--------------+
| TOTAL CO2,  |        34. (H)  | mmol/l    |            |              |
| PLASMA      |                 |           |            |              |
| (LAB)       |                 |           |            |              |
+-------------+-----------------+-----------+------------+--------------+
| BUN, PLASMA |         8.      | mg/dL     |            |              |
|  (LAB)      |                 |           |            |              |
+-------------+-----------------+-----------+------------+--------------+
| CREATININE  |         0.7     | mg/dL     |            |              |
 
| PLASMA      |                 |           |            |              |
| (LAB)       |                 |           |            |              |
+-------------+-----------------+-----------+------------+--------------+
| GLUCOSE,    |       101.      | mg/dL     |            |              |
| PLASMA      |                 |           |            |              |
| (LAB)       |                 |           |            |              |
+-------------+-----------------+-----------+------------+--------------+
| CALCIUM,    |         7.7 (L) | mg/dL     |            |              |
| PLASMA      |                 |           |            |              |
| (LAB)       |                 |           |            |              |
+-------------+-----------------+-----------+------------+--------------+
| MAGNESIUM,P |         1.5 (L) | mg/dL     |            |              |
| LASMA       |                 |           |            |              |
+-------------+-----------------+-----------+------------+--------------+
| PHOSPHORUS, |         1.3 (L) | mg/dL     |            |              |
|  PLASMA     |                 |           |            |              |
| (LAB)       |                 |           |            |              |
+-------------+-----------------+-----------+------------+--------------+
| URIC ACID,  |         4.7     | mg/dL     |            |              |
| PLASMA      |                 |           |            |              |
| (LAB)       |                 |           |            |              |
+-------------+-----------------+-----------+------------+--------------+
| AST(SGOT)   |        18.      | U/L       |            |              |
+-------------+-----------------+-----------+------------+--------------+
| ALT (SGPT)  |        19.      | U/L       |            |              |
+-------------+-----------------+-----------+------------+--------------+
| ALK PHOS    |        43.      | U/L       |            |              |
+-------------+-----------------+-----------+------------+--------------+
| LD TOTAL,   |       186.      | U/L       |            |              |
| PLASMA      |                 |           |            |              |
+-------------+-----------------+-----------+------------+--------------+
| BILIRUBIN   |         0.2     | mg/dL     |            |              |
| DIRECT      |                 |           |            |              |
+-------------+-----------------+-----------+------------+--------------+
| BILIRUBIN   |         0.9     | mg/dL     |            |              |
| TOTAL       |                 |           |            |              |
+-------------+-----------------+-----------+------------+--------------+
| TOTAL       |         4.4 (L) | GM/DL     |            |              |
| PROTEIN,    |                 |           |            |              |
| PLASMA      |                 |           |            |              |
| (LAB)       |                 |           |            |              |
+-------------+-----------------+-----------+------------+--------------+
| ALBUMIN,    |         2.5 (L) | GM/DL     |            |              |
| PLASMA      |                 |           |            |              |
| (LAB)       |                 |           |            |              |
+-------------+-----------------+-----------+------------+--------------+
 
 
 
+----------+
| Specimen |
+----------+
|          |
+----------+
 
 
 
+----------------------+------------------------+--------------------+--------------+
| Performing           | Address                | City/State/Zipcode | Phone Number |
| Organization         |                        |                    |              |
 
+----------------------+------------------------+--------------------+--------------+
|   [facility] |   [address]  | [place] [address] |              |
|  PATHOLOGY           | [address]                |                    |              |
+----------------------+------------------------+--------------------+--------------+
 CBC TESTS 2 (1996  7:30 AM PST)
 
+-------------+------------------+-----------+------------+--------------+
| Component   | Value            | Ref Range | Performed  | Pathologist  |
|             |                  |           | At         | Signature    |
+-------------+------------------+-----------+------------+--------------+
| WHITE CELL  |         7.6      | K/CU MM   |            |              |
| COUNT       |                  |           |            |              |
+-------------+------------------+-----------+------------+--------------+
| RED CELL    |         2.75 (L) | M/CU MM   |            |              |
| COUNT       |                  |           |            |              |
+-------------+------------------+-----------+------------+--------------+
| HEMOGLOBIN  |         8.8 (L)  | GM/DL     |            |              |
+-------------+------------------+-----------+------------+--------------+
| HEMATOCRIT  |        25.4 (L)  | %         |            |              |
+-------------+------------------+-----------+------------+--------------+
| MCV         |        92.1      | FL        |            |              |
+-------------+------------------+-----------+------------+--------------+
| MCH         |        32.       | PG        |            |              |
+-------------+------------------+-----------+------------+--------------+
| MCHC        |        34.7 (H)  | GM/DL     |            |              |
+-------------+------------------+-----------+------------+--------------+
| RDW         |        13.5      | %         |            |              |
+-------------+------------------+-----------+------------+--------------+
| PLATELET    |       101. (L)   | K/CU MM   |            |              |
| COUNT       |                  |           |            |              |
+-------------+------------------+-----------+------------+--------------+
| MPV         |         7.8      | FL        |            |              |
+-------------+------------------+-----------+------------+--------------+
 
 
 
+----------+
| Specimen |
+----------+
|          |
+----------+
 
 
 
+----------------------+------------------------+--------------------+--------------+
| Performing           | Address                | City/State/Zipcode | Phone Number |
| Organization         |                        |                    |              |
+----------------------+------------------------+--------------------+--------------+
|   [facility] |   [address]  | [place] [address] |              |
|  PATHOLOGY           | [address]                |                    |              |
+----------------------+------------------------+--------------------+--------------+
 CBC TESTS 2 (1996  1:50 PM PST)
 
+-------------+-----------------+-----------+------------+--------------+
| Component   | Value           | Ref Range | Performed  | Pathologist  |
|             |                 |           | At         | Signature    |
+-------------+-----------------+-----------+------------+--------------+
| WHITE CELL  |         9.6     | K/CU MM   |            |              |
| COUNT       |                 |           |            |              |
+-------------+-----------------+-----------+------------+--------------+
 
| RED CELL    |         4.      | M/CU MM   |            |              |
| COUNT       |                 |           |            |              |
+-------------+-----------------+-----------+------------+--------------+
| HEMOGLOBIN  |        12.6     | GM/DL     |            |              |
+-------------+-----------------+-----------+------------+--------------+
| HEMATOCRIT  |        35.4 (L) | %         |            |              |
+-------------+-----------------+-----------+------------+--------------+
| MCV         |        88.6     | FL        |            |              |
+-------------+-----------------+-----------+------------+--------------+
| MCH         |        31.6     | PG        |            |              |
+-------------+-----------------+-----------+------------+--------------+
| MCHC        |        35.7 (H) | GM/DL     |            |              |
+-------------+-----------------+-----------+------------+--------------+
| RDW         |        13.      | %         |            |              |
+-------------+-----------------+-----------+------------+--------------+
| PLATELET    |       141. (L)  | K/CU MM   |            |              |
| COUNT       |                 |           |            |              |
+-------------+-----------------+-----------+------------+--------------+
| MPV         |         7.6     | FL        |            |              |
+-------------+-----------------+-----------+------------+--------------+
 
 
 
+----------+
| Specimen |
+----------+
|          |
+----------+
 
 
 
+----------------------+------------------------+--------------------+--------------+
| Performing           | Address                | City/State/Zipcode | Phone Number |
| Organization         |                        |                    |              |
+----------------------+------------------------+--------------------+--------------+
|   [facility] |   [address]  | [place] [address] |              |
|  PATHOLOGY           | [address]                |                    |              |
+----------------------+------------------------+--------------------+--------------+
 CBC TESTS 2 (1996  5:30 AM PST)
 
+-------------+-----------------+-----------+------------+--------------+
| Component   | Value           | Ref Range | Performed  | Pathologist  |
|             |                 |           | At         | Signature    |
+-------------+-----------------+-----------+------------+--------------+
| WHITE CELL  |        11.4 (H) | K/CU MM   |            |              |
| COUNT       |                 |           |            |              |
+-------------+-----------------+-----------+------------+--------------+
| RED CELL    |         4.63    | M/CU MM   |            |              |
| COUNT       |                 |           |            |              |
+-------------+-----------------+-----------+------------+--------------+
| HEMOGLOBIN  |        14.3     | GM/DL     |            |              |
+-------------+-----------------+-----------+------------+--------------+
| HEMATOCRIT  |        41.8     | %         |            |              |
+-------------+-----------------+-----------+------------+--------------+
| MCV         |        90.2     | FL        |            |              |
+-------------+-----------------+-----------+------------+--------------+
| MCH         |        30.8     | PG        |            |              |
+-------------+-----------------+-----------+------------+--------------+
| MCHC        |        34.2 (H) | GM/DL     |            |              |
+-------------+-----------------+-----------+------------+--------------+
 
| RDW         |        12.5     | %         |            |              |
+-------------+-----------------+-----------+------------+--------------+
| PLATELET    |       151.      | K/CU MM   |            |              |
| COUNT       |                 |           |            |              |
+-------------+-----------------+-----------+------------+--------------+
| MPV         |         7.4     | FL        |            |              |
+-------------+-----------------+-----------+------------+--------------+
| NEUTROPHIL  |        87. (H)  | %         |            |              |
| %           |                 |           |            |              |
+-------------+-----------------+-----------+------------+--------------+
| LYMPHOCYTE  |         7. (L)  | %         |            |              |
| %           |                 |           |            |              |
+-------------+-----------------+-----------+------------+--------------+
| MONOCYTE %  |         6.      | %         |            |              |
+-------------+-----------------+-----------+------------+--------------+
| EOS %       |         0.      | %         |            |              |
+-------------+-----------------+-----------+------------+--------------+
| BASO %      |         0.      | %         |            |              |
+-------------+-----------------+-----------+------------+--------------+
| NEUTROPHIL  |         9.9 (H) | K/CU MM   |            |              |
| #           |                 |           |            |              |
+-------------+-----------------+-----------+------------+--------------+
| LYMPHOCYTE  |         0.8 (L) | K/CU MM   |            |              |
| #           |                 |           |            |              |
+-------------+-----------------+-----------+------------+--------------+
| MONOCYTE #  |         0.7 (H) | K/CU MM   |            |              |
+-------------+-----------------+-----------+------------+--------------+
| EOS #       |         0.      | K/CU MM   |            |              |
+-------------+-----------------+-----------+------------+--------------+
| BASO #      |         0.      | K/CU MM   |            |              |
+-------------+-----------------+-----------+------------+--------------+
 
 
 
+----------+
| Specimen |
+----------+
|          |
+----------+
 
 
 
+----------------------+------------------------+--------------------+--------------+
| Performing           | Address                | City/State/Zipcode | Phone Number |
| Organization         |                        |                    |              |
+----------------------+------------------------+--------------------+--------------+
|   [facility] |   [address]  | [place] [address] |              |
|  PATHOLOGY           | [address]                |                    |              |
+----------------------+------------------------+--------------------+--------------+
 BLOOD GASES, ARTERIAL - LAB (1996  5:30 AM PST)
 
+-------------+------------------+------------+------------+--------------+
| Component   | Value            | Ref Range  | Performed  | Pathologist  |
|             |                  |            | At         | Signature    |
+-------------+------------------+------------+------------+--------------+
| PAT TEMP    |        37.3      | DEGREES C. |            |              |
| ARTERIAL    |                  |            |            |              |
+-------------+------------------+------------+------------+--------------+
| FIO2        |         6. (L)   | DEGREES C. |            |              |
| ARTERIAL    |                  |            |            |              |
 
+-------------+------------------+------------+------------+--------------+
| PH ARTERIAL |         7.35 (L) | DEGREES C. |            |              |
+-------------+------------------+------------+------------+--------------+
| PCO2        |        45. (H)   | MM HG      |            |              |
| ARTERIAL    |                  |            |            |              |
+-------------+------------------+------------+------------+--------------+
| PO2         |       103.       | MM HG      |            |              |
| ARTERIAL    |                  |            |            |              |
+-------------+------------------+------------+------------+--------------+
| BASE EXCESS |        -0.6      | MM HG      |            |              |
|  ARTERIAL   |                  |            |            |              |
+-------------+------------------+------------+------------+--------------+
| HCO3        |        25.       | mmol/l     |            |              |
| ARTERIAL    |                  |            |            |              |
+-------------+------------------+------------+------------+--------------+
| TOTAL CO2   |        26.       | mmol/l     |            |              |
| ARTERIAL    |                  |            |            |              |
+-------------+------------------+------------+------------+--------------+
| CALC %O2    |        97.4      | % O2 Sat   |            |              |
| SAT ARTER   |                  |            |            |              |
+-------------+------------------+------------+------------+--------------+
 
 
 
+----------+
| Specimen |
+----------+
|          |
+----------+
 
 
 
+----------------------+------------------------+--------------------+--------------+
| Performing           | Address                | City/State/Zipcode | Phone Number |
| Organization         |                        |                    |              |
+----------------------+------------------------+--------------------+--------------+
|   [facility] |   [address]  | [place] [address] |              |
|  PATHOLOGY           | [address]                |                    |              |
+----------------------+------------------------+--------------------+--------------+
 CHEMISTRY TESTS 2 (1996  5:30 AM PST)
 
+-------------+----------------+-----------+------------+--------------+
| Component   | Value          | Ref Range | Performed  | Pathologist  |
|             |                |           | At         | Signature    |
+-------------+----------------+-----------+------------+--------------+
| CHOLESTEROL |       133. (L) | mg/dL     |            |              |
|   (LAB)     |                |           |            |              |
+-------------+----------------+-----------+------------+--------------+
 
 
 
+----------+
| Specimen |
+----------+
|          |
+----------+
 
 
 
+----------------------+------------------------+--------------------+--------------+
 
| Performing           | Address                | City/State/Zipcode | Phone Number |
| Organization         |                        |                    |              |
+----------------------+------------------------+--------------------+--------------+
|   [facility] |   [address]  | [place] [address] |              |
|  PATHOLOGY           | [address]                |                    |              |
+----------------------+------------------------+--------------------+--------------+
 CHEMISTRY TESTS 4 (1996  5:30 AM PST)
 
+-------------+-----------------+-----------+------------+--------------+
| Component   | Value           | Ref Range | Performed  | Pathologist  |
|             |                 |           | At         | Signature    |
+-------------+-----------------+-----------+------------+--------------+
| SODIUM,     |       143.      | mmol/l    |            |              |
| PLASMA      |                 |           |            |              |
| (LAB)       |                 |           |            |              |
+-------------+-----------------+-----------+------------+--------------+
| POTASSIUM,  |         3.8     | mmol/l    |            |              |
| PLASMA      |                 |           |            |              |
| (LAB)       |                 |           |            |              |
+-------------+-----------------+-----------+------------+--------------+
| CHLORIDE,   |       111. (H)  | mmol/l    |            |              |
| PLASMA      |                 |           |            |              |
| (LAB)       |                 |           |            |              |
+-------------+-----------------+-----------+------------+--------------+
| TOTAL CO2,  |        23.      | mmol/l    |            |              |
| PLASMA      |                 |           |            |              |
| (LAB)       |                 |           |            |              |
+-------------+-----------------+-----------+------------+--------------+
| BUN, PLASMA |        16.      | mg/dL     |            |              |
|  (LAB)      |                 |           |            |              |
+-------------+-----------------+-----------+------------+--------------+
| CREATININE  |         0.9     | mg/dL     |            |              |
| PLASMA      |                 |           |            |              |
| (LAB)       |                 |           |            |              |
+-------------+-----------------+-----------+------------+--------------+
| GLUCOSE,    |       157. (H)  | mg/dL     |            |              |
| PLASMA      |                 |           |            |              |
| (LAB)       |                 |           |            |              |
+-------------+-----------------+-----------+------------+--------------+
| CALCIUM,    |         7.8 (L) | mg/dL     |            |              |
| PLASMA      |                 |           |            |              |
| (LAB)       |                 |           |            |              |
+-------------+-----------------+-----------+------------+--------------+
| MAGNESIUM,P |         1.3 (L) | mg/dL     |            |              |
| LASMA       |                 |           |            |              |
+-------------+-----------------+-----------+------------+--------------+
| PHOSPHORUS, |         3.1     | mg/dL     |            |              |
|  PLASMA     |                 |           |            |              |
| (LAB)       |                 |           |            |              |
+-------------+-----------------+-----------+------------+--------------+
| URIC ACID,  |         5.5     | mg/dL     |            |              |
| PLASMA      |                 |           |            |              |
| (LAB)       |                 |           |            |              |
+-------------+-----------------+-----------+------------+--------------+
| AST(SGOT)   |        30.      | U/L       |            |              |
+-------------+-----------------+-----------+------------+--------------+
| ALT (SGPT)  |        23.      | U/L       |            |              |
+-------------+-----------------+-----------+------------+--------------+
| ALK PHOS    |        46.      | U/L       |            |              |
+-------------+-----------------+-----------+------------+--------------+
 
| LD TOTAL,   |       476. (H)  | U/L       |            |              |
| PLASMA      |                 |           |            |              |
+-------------+-----------------+-----------+------------+--------------+
| BILIRUBIN   |         0.3     | mg/dL     |            |              |
| DIRECT      |                 |           |            |              |
+-------------+-----------------+-----------+------------+--------------+
| BILIRUBIN   |         1.5 (H) | mg/dL     |            |              |
| TOTAL       |                 |           |            |              |
+-------------+-----------------+-----------+------------+--------------+
| TOTAL       |         5.2 (L) | GM/DL     |            |              |
| PROTEIN,    |                 |           |            |              |
| PLASMA      |                 |           |            |              |
| (LAB)       |                 |           |            |              |
+-------------+-----------------+-----------+------------+--------------+
| ALBUMIN,    |         3. (L)  | GM/DL     |            |              |
| PLASMA      |                 |           |            |              |
| (LAB)       |                 |           |            |              |
+-------------+-----------------+-----------+------------+--------------+
 
 
 
+----------+
| Specimen |
+----------+
|          |
+----------+
 
 
 
+----------------------+------------------------+--------------------+--------------+
| Performing           | Address                | City/State/Zipcode | Phone Number |
| Organization         |                        |                    |              |
+----------------------+------------------------+--------------------+--------------+
|   [facility] |   [address]  | [place] [address] |              |
|  PATHOLOGY           | [address]                |                    |              |
+----------------------+------------------------+--------------------+--------------+
 TRANSFUSION MEDICINE TESTS (1996 11:59 PM PST)
 
+-----------+----------+-----------+------------+--------------+
| Component | Value    | Ref Range | Performed  | Pathologist  |
|           |          |           | At         | Signature    |
+-----------+----------+-----------+------------+--------------+
| ABO GROUP | A        |           |            |              |
+-----------+----------+-----------+------------+--------------+
| RH TYPE   | POS      |           |            |              |
+-----------+----------+-----------+------------+--------------+
| ANTIBODY  | NEGATIVE |           |            |              |
| SCREEN    |          |           |            |              |
+-----------+----------+-----------+------------+--------------+
 
 
 
+----------+
| Specimen |
+----------+
|          |
+----------+
 
 
 
 
+----------------------+------------------------+--------------------+--------------+
| Performing           | Address                | City/State/Zipcode | Phone Number |
| Organization         |                        |                    |              |
+----------------------+------------------------+--------------------+--------------+
|   [facility] |   [address]  | [place] [address] |              |
|  PATHOLOGY           | [address]                |                    |              |
+----------------------+------------------------+--------------------+--------------+
 CHEMISTRY TESTS 4 (1996 10:00 PM PST)
 
+-------------+-------------+-----------+------------+--------------+
| Component   | Value       | Ref Range | Performed  | Pathologist  |
|             |             |           | At         | Signature    |
+-------------+-------------+-----------+------------+--------------+
| SODIUM,     |       137.  | mmol/l    |            |              |
| PLASMA      |             |           |            |              |
| (LAB)       |             |           |            |              |
+-------------+-------------+-----------+------------+--------------+
| POTASSIUM,  |         3.8 | mmol/l    |            |              |
| PLASMA      |             |           |            |              |
| (LAB)       |             |           |            |              |
+-------------+-------------+-----------+------------+--------------+
 
 
 
+----------+
| Specimen |
+----------+
|          |
+----------+
 
 
 
+----------------------+------------------------+--------------------+--------------+
| Performing           | Address                | City/State/Zipcode | Phone Number |
| Organization         |                        |                    |              |
+----------------------+------------------------+--------------------+--------------+
|   [facility] |   [address]  | [place] [address] |              |
|  PATHOLOGY           | [address]                |                    |              |
+----------------------+------------------------+--------------------+--------------+
 CBC TESTS 2 (1996 10:00 PM PST)
 
+-------------+------------------+-----------+------------+--------------+
| Component   | Value            | Ref Range | Performed  | Pathologist  |
|             |                  |           | At         | Signature    |
+-------------+------------------+-----------+------------+--------------+
| WHITE CELL  |        14.6 (H)  | K/CU MM   |            |              |
| COUNT       |                  |           |            |              |
+-------------+------------------+-----------+------------+--------------+
| RED CELL    |         2.75 (L) | M/CU MM   |            |              |
| COUNT       |                  |           |            |              |
+-------------+------------------+-----------+------------+--------------+
| HEMOGLOBIN  |         8.4 (L)  | GM/DL     |            |              |
+-------------+------------------+-----------+------------+--------------+
| HEMATOCRIT  |        24.3 (L)  | %         |            |              |
+-------------+------------------+-----------+------------+--------------+
| MCV         |        88.1      | FL        |            |              |
+-------------+------------------+-----------+------------+--------------+
| MCH         |        30.5      | PG        |            |              |
+-------------+------------------+-----------+------------+--------------+
| MCHC        |        34.5 (H)  | GM/DL     |            |              |
 
+-------------+------------------+-----------+------------+--------------+
| RDW         |        12.2      | %         |            |              |
+-------------+------------------+-----------+------------+--------------+
| PLATELET    |       267.       | K/CU MM   |            |              |
| COUNT       |                  |           |            |              |
+-------------+------------------+-----------+------------+--------------+
| MPV         |         7.7      | [place]        |            |              |
+-------------+------------------+-----------+------------+--------------+
 
 
 
+----------+
| Specimen |
+----------+
|          |
+----------+
 
 
 
+----------------------+------------------------+--------------------+--------------+
| Performing           | Address                | City/State/Zipcode | Phone Number |
| Organization         |                        |                    |              |
+----------------------+------------------------+--------------------+--------------+
|   [facility] |   [address]  | [place] [address] |              |
|  PATHOLOGY           | [address]                |                    |              |
+----------------------+------------------------+--------------------+--------------+
 CHEMISTRY TESTS 4 (1996  2:15 PM PST)
 
+-------------+----------------+-----------+------------+--------------+
| Component   | Value          | Ref Range | Performed  | Pathologist  |
|             |                |           | At         | Signature    |
+-------------+----------------+-----------+------------+--------------+
| SODIUM,     |       140.     | mmol/l    |            |              |
| PLASMA      |                |           |            |              |
| (LAB)       |                |           |            |              |
+-------------+----------------+-----------+------------+--------------+
| POTASSIUM,  |         4.     | mmol/l    |            |              |
| PLASMA      |                |           |            |              |
| (LAB)       |                |           |            |              |
+-------------+----------------+-----------+------------+--------------+
| CHLORIDE,   |       102.     | mmol/l    |            |              |
| PLASMA      |                |           |            |              |
| (LAB)       |                |           |            |              |
+-------------+----------------+-----------+------------+--------------+
| TOTAL CO2,  |        31. (H) | mmol/l    |            |              |
| PLASMA      |                |           |            |              |
| (LAB)       |                |           |            |              |
+-------------+----------------+-----------+------------+--------------+
| BUN, PLASMA |        22.     | mg/dL     |            |              |
|  (LAB)      |                |           |            |              |
+-------------+----------------+-----------+------------+--------------+
| CREATININE  |         0.9    | mg/dL     |            |              |
| PLASMA      |                |           |            |              |
| (LAB)       |                |           |            |              |
+-------------+----------------+-----------+------------+--------------+
| GLUCOSE,    |        95.     | mg/dL     |            |              |
| PLASMA      |                |           |            |              |
| (LAB)       |                |           |            |              |
+-------------+----------------+-----------+------------+--------------+
 
 
 
 
+----------+
| Specimen |
+----------+
|          |
+----------+
 
 
 
+----------------------+------------------------+--------------------+--------------+
| Performing           | Address                | City/State/Zipcode | Phone Number |
| Organization         |                        |                    |              |
+----------------------+------------------------+--------------------+--------------+
|   [facility] |   [address]  | [place] [address] |              |
|  PATHOLOGY           | [address]                |                    |              |
+----------------------+------------------------+--------------------+--------------+
 CBC TESTS 2 (1996  2:15 PM PST)
 
+-------------+----------------+-----------+------------+--------------+
| Component   | Value          | Ref Range | Performed  | Pathologist  |
|             |                |           | At         | Signature    |
+-------------+----------------+-----------+------------+--------------+
| WHITE CELL  |         5.7    | K/CU MM   |            |              |
| COUNT       |                |           |            |              |
+-------------+----------------+-----------+------------+--------------+
| RED CELL    |         4.54   | M/CU MM   |            |              |
| COUNT       |                |           |            |              |
+-------------+----------------+-----------+------------+--------------+
| HEMOGLOBIN  |        13.8    | GM/DL     |            |              |
+-------------+----------------+-----------+------------+--------------+
| HEMATOCRIT  |        40.6    | %         |            |              |
+-------------+----------------+-----------+------------+--------------+
| MCV         |        89.5    | FL        |            |              |
+-------------+----------------+-----------+------------+--------------+
| MCH         |        30.4    | PG        |            |              |
+-------------+----------------+-----------+------------+--------------+
| MCHC        |        34. (H) | GM/DL     |            |              |
+-------------+----------------+-----------+------------+--------------+
| RDW         |        12.3    | %         |            |              |
+-------------+----------------+-----------+------------+--------------+
| PLATELET    |       306.     | K/CU MM   |            |              |
| COUNT       |                |           |            |              |
+-------------+----------------+-----------+------------+--------------+
| MPV         |         8.     | [place]        |            |              |
+-------------+----------------+-----------+------------+--------------+
 
 
 
+----------+
| Specimen |
+----------+
|          |
+----------+
 
 
 
+----------------------+------------------------+--------------------+--------------+
| Performing           | Address                | City/State/Zipcode | Phone Number |
| Organization         |                        |                    |              |
 
+----------------------+------------------------+--------------------+--------------+
|   [facility] |   [address]  | [place] [address] |              |
|  PATHOLOGY           | [address]                |                    |              |
+----------------------+------------------------+--------------------+--------------+
 documented in this encounter
 
 Visit Diagnoses
 Not on filedocumented in this encounter 37.3

## 2020-11-01 NOTE — OB PROVIDER H&P - ASSESSMENT
39yo  @ 38.4 wks SLIUP uncomp PNC here with SROM since 10:30a  -pt is a previous  x 3 scheduled for a rpt with BTL  - huddle called   -pt was crossed for 2 units of blood  -pt was swabbed for Covid-19  -pt was dw Dr Rodriguez. Pt for rpt  with BTL at 14:30 (8 hrs NPO) unless pt status changes

## 2020-11-01 NOTE — PROVIDER CONTACT NOTE (OTHER) - ASSESSMENT
Patient orthos: (lying down)- 103/52, 74 / (sitting) 109/62, 80, (standing) 97/57, 92. Patient complains of lightheaded when standing. Zofran was given at 2145 by L&D nurse on postpartum admission at bedside for emesis episode.  Patient had another emesis episode 200ml of light green fluid around 2300.

## 2020-11-01 NOTE — OB PROVIDER TRIAGE NOTE - NSOBPROVIDERNOTE_OBGYN_ALL_OB_FT
41yo  @ 38.4 wks SLIUP uncomp PNC here with SROM since 10:30a  -pt is a previous  x 3 scheduled for a rpt with BTL  - huddle called   -pt was crossed for 2 units of blood  -pt was swabbed for Covid-19  -pt was dw Dr Grimaldo. Pt for rpt  with BTL at 14:30 (8 hrs NPO) unless pt status changes

## 2020-11-01 NOTE — OB PROVIDER H&P - NS_OBGYNHISTORY_OBGYN_ALL_OB_FT
[id number] for distress 7#  3-9# rpt    2013-8# rpt   -------------------------  fibroids as teen  PCOS

## 2020-11-01 NOTE — OB RN DELIVERY SUMMARY - NS_SEPSISRSKCALC_OBGYN_ALL_OB_FT
EOS calculated successfully. EOS Risk Factor: 0.5/1000 live births ([facility] national incidence); GA=38w4d; Temp=99.1; ROM=6.567; GBS='Unknown'; Antibiotics='No antibiotics or any antibiotics < 2 hrs prior to birth'

## 2020-11-01 NOTE — OB PROVIDER H&P - NS_PARA_OBGYN_ALL_OB_NU
CARDIOLOGY PROGRESS NOTE      HISTORY    Sosa Myrick is a 79 year old female who presents for follow up.  The patient was admitted to the hospital from 05/08/2019 to 05/10/2019 with to do edema secondary to lisinopril. Since discharge, the patient denies having any chest pain or shortness of breath.  She has had no dizziness.  She has been having some swelling in her ankles and feet.  She presents to the office with a family member.    Patient was recently admitted 4/22/19 to 5/2/19 with acute UTI, ureteral stent, severe right hydronephrosis and sigmoid colon diverticulitis with perforation.  She underwent exploratory laparotomy, splenic flexture mobilization, sigmoidectomy, colostomy and appendectomy on 4/24/19.  During that hospitalization, patient had episodes of paroxysmal atrial fibrillation.  She was started on diltiazem and maintained sinus rhythm.  Blood pressure was also elevated and she was started on lisinopril.       Echo 4/25/2019  LVEF 60%  - Normal left ventricular chamber size and systolic function  - Normal right ventricular size and systolic function, RVSP 29 mmHg  - Trace aortic valve regurgitation  - Trace to mild tricuspid valve regurgitation     MEDICAL HISTORY    Past Medical History:   Diagnosis Date   • Pneumonia    • Urinary tract infection        SURGICAL HISTORY    Past Surgical History:   Procedure Laterality Date   • Eye surgery Bilateral     cataracts   • Tonsillectomy and adenoidectomy      6 y/o       MEDICATIONS    Current Outpatient Medications   Medication Sig   • doxazosin (CARDURA) 1 MG tablet Take 1 tablet by mouth nightly.   • predniSONE (DELTASONE) 10 MG tablet Take 4 tablets daily for 2 days, 2 tablets daily for 2 days, 1 tablet daily for 2 days and then stop.   • cetirizine (ZYRTEC) 10 MG tablet Take 1 tablet by mouth daily.   • famotidine (PEPCID) 20 MG tablet Take 1 tablet by mouth 2 times daily.   • oxybutynin (DITROPAN) 5 MG tablet Take 0.5 tablets by mouth daily.    • phenazopyridine (PYRIDIUM) 100 MG tablet Take 1 tablet by mouth 3 times daily (with meals).   • dilTIAZem (CARDIZEM CD) 120 MG 24 hr capsule Take 1 capsule by mouth daily.   • B Complex Vitamins (B COMPLEX-B12) Tab Take 1 tablet by mouth daily as needed. When she remembers   • Probiotic Product (PROBIOTIC DAILY PO) Take 1 tablet by mouth as needed.    • aspirin 81 MG tablet Take 81 mg by mouth daily. When she remembers   • VITAMIN D, CHOLECALCIFEROL, PO Take 2,000 Int'l Units by mouth daily as needed. When she remembers     No current facility-administered medications for this visit.        ALLERGIES    ALLERGIES:   Allergen Reactions   • Lisinopril SWELLING     ANGIOEDEMA   • Augmentin [Amoclan] RASH   • Nitrofurantoin Other (See Comments)     Shaking, affects nerves, tremors   • Sulfa Antibiotics RASH       PHYSICAL EXAM    Vital Signs:    Vitals:    05/21/19 1300   BP: 122/68   Pulse: 66   Resp: 16   Weight: 73.6 kg   Height: 5' 7\" (1.702 m)     HENT:  Normocephalic.  Sclerae nonicteric.  No xanthomas.  Neck:  No JVD (jugular venous distension).  No carotid bruits.  No thyromegaly.   Cardiovascular:  Regular rate, normal S1-S2. No murmurs.  PMI is nondisplaced.  No rubs.  No gallops.    Respiratory:  Lungs clear to auscultation bilaterally, normal respiratory effort.   Gastrointestinal:  Bowel sounds normal.  Soft.  No tenderness.  No masses.  No pulsatile masses.  No hepatosplenomegaly.  Neurologic/Psychiatric:  Alert and oriented x3.  Normal affect.  Extremities:  No clubbing or edema, symmetric dorsalis pedis/posterior tibialis pulses.    ASSESSMENT   1. Hypertension.  2. Paroxysmal atrial fibrillation.  3. Angioedema secondary to lisinopril.    PLAN  Blood pressure improved on current regimen.  Patient with recent surgery, currently in sinus rhythm not on anticoagulation. Continue aspirin 81 mg daily.  Continue present cardiac medications.  Follow up with me in 3 months.    On 5/21/2019, Iraida YI  Nelson scribed the services personally performed by Trip Joyce MD     The documentation recorded by the scribe accurately and completely reflects the service(s) I personally performed and the decisions made by me.      Trip Joyce MD, FACC, FACP  [facility]       3

## 2020-11-01 NOTE — OB PROVIDER H&P - ATTENDING COMMENTS
Patient was seen and evaluated at bedside. H&P reviewed. Agree with the above plan. Patient at early term with hx of CDx3, and hx of fibroids, with uncomplicated prenatal course, presented with SROM and mild contractions, NPO 8 hour at 230 pm. Desires RCD, BTL (was scheduled for 39weeks). Patient was counseled on risks of the the procedure: pain, bleeding, infection, injury to adjacent organs and vessels, VTE, possible need for blood transfusion, possible need for hysterectomy. Patient verbalized understanding. Tubal, consent for blood transfusion and consent for RCD, BTL, possible hysterectomy signed. Questions answered. Patient's significant other preset at the time of the consent. Plan to OR at 230 for RCD, BTL, 2 u PRBC on standby. IFV, preop meds and ab. Patient was seen by anaesthesia. Recovery and follow up discussed. Labs, VS, PNR reviewed.  MD hien

## 2020-11-01 NOTE — PROVIDER CONTACT NOTE (OTHER) - ACTION/TREATMENT ORDERED:
Reglan ordered. Lactated Ringers at 125ml/hr ordered. Educate patient to continue to hydrate. Save next emesis episode fluids. Reevaluate in two hours. Make sure urine output remains above 45ml/hr.

## 2020-11-01 NOTE — OB RN PATIENT PROFILE - PRESSURE ULCER(S)
SUBJECTIVE:  Ms. Orta is seen today at the request of Bea Harrison PA.     Patient seen for annual hearing evaluation. She reported that she does not notice as much of a difference between her right and left ear, but otherwise has not noticed any change in hearing symptoms. Please see Christy Harrison's dictation for detailed case history.     OBJECTIVE:  AUDIOMETRIC RESULTS  Otoscopic Evaluation:  Examination reveals clear ear canals bilaterally    Audiogram:  Right ear:  Normal to borderline normal hearing sensitivity through 2000 Hz sloping to a severe sensorineural hearing loss.  Left ear:  Normal to borderline normal hearing sensitivity through 2000 Hz sloping to a severe sensorineural hearing loss.    Speech Audiometry:  Speech Reception Thresholds:  20 dB right ear and 20 dB left ear.  Word Recognition Test Scores:  96% right ear when presented at 70 dB HL in quiet and 96% left ear when presented at 70 dB HL in quiet.    Acoustic Immittance:  Right ear:  Middle ear pressure and eardrum mobility within defined limits  Normal equivalent ear canal volume    Left ear:  Middle ear pressure and eardrum mobility within defined limits  Normal equivalent ear canal volume    IMPRESSIONS:  Normal to borderline normal hearing sensitivity sloping to a severe sensorineural hearing loss, bilaterally. Thresholds in the left ear are similar to those obtained on 7/13/2016. Right ear thresholds have improved, closing the air-bone gaps noted at 1000 and 4000 Hz, in the right ear.      RECOMMENDATIONS:  These results were reviewed with the patient and will be forwarded to Bea Harrison PA.    Follow up with TONA Bowman regarding today's results.     The patient indicated understanding of the diagnosis and was encouraged to contact our department with any questions or concerns.    
no

## 2020-11-01 NOTE — OB NEONATOLOGY/PEDIATRICIAN DELIVERY SUMMARY - NSPEDSNEONOTESA_OBGYN_ALL_OB_FT
38.4 wk female born via repeat CS to a 41 y/o  blood type A+ mother. Maternal history significant for AMA. Prenatal history significant for persistent R umbilical vein, prenatal echo WNL, per mom was told to f/u post-partum. PNL -/-/NR/I, GBS unknown. SROM at 10:30 (7 hr rupture) with clear fluids. Baby emerged vigorous, crying, was w/d/s/s with APGARS of 9/9 . Mom plans to initiate breastfeeding, consents Hep B vaccine.  EOS 0.13

## 2020-11-01 NOTE — OB PROVIDER TRIAGE NOTE - HISTORY OF PRESENT ILLNESS
41yo female  @ 38.4 wks SLIUP uncomp PNC here complaining of rupture of clear fluid at 10:30am. Pt reports GFM and denies ctx's. Pt is a previous  x 3 scheduled for a rpt  with BTL.    Pmhx-denies  Pshx/Szvh-q-foeoaxd x 3  Meds-PNV; dolcolax; citracil; fiber   NKDA  Gyn-fibroids as teen; PCOS  Past [id number] for distress 7#               3-9# rpt                2013-8# rpt   Soc-denies

## 2020-11-01 NOTE — OB PROVIDER TRIAGE NOTE - NS_OBGYNHISTORY_OBGYN_ALL_OB_FT
[id number] for distress 7#  3-9# rpt    2013-8# rpt   -------------------------  fibroids as teen  PCOS

## 2020-11-02 ENCOUNTER — TRANSCRIPTION ENCOUNTER (OUTPATIENT)
Age: 40
End: 2020-11-02

## 2020-11-02 ENCOUNTER — APPOINTMENT (OUTPATIENT)
Dept: DISASTER EMERGENCY | Facility: CLINIC | Age: 40
End: 2020-11-02

## 2020-11-02 LAB
BASOPHILS # BLD AUTO: 0.01 K/UL — SIGNIFICANT CHANGE UP (ref 0–0.2)
BASOPHILS NFR BLD AUTO: 0.1 % — SIGNIFICANT CHANGE UP (ref 0–2)
EOSINOPHIL # BLD AUTO: 0.01 K/UL — SIGNIFICANT CHANGE UP (ref 0–0.5)
EOSINOPHIL NFR BLD AUTO: 0.1 % — SIGNIFICANT CHANGE UP (ref 0–6)
HCT VFR BLD CALC: 31.8 % — LOW (ref 34.5–45)
HGB BLD-MCNC: 10.1 G/DL — LOW (ref 11.5–15.5)
IMM GRANULOCYTES NFR BLD AUTO: 0.7 % — SIGNIFICANT CHANGE UP (ref 0–1.5)
LYMPHOCYTES # BLD AUTO: 1.35 K/UL — SIGNIFICANT CHANGE UP (ref 1–3.3)
LYMPHOCYTES # BLD AUTO: 12.8 % — LOW (ref 13–44)
MCHC RBC-ENTMCNC: 27.4 PG — SIGNIFICANT CHANGE UP (ref 27–34)
MCHC RBC-ENTMCNC: 31.8 % — LOW (ref 32–36)
MCV RBC AUTO: 86.2 FL — SIGNIFICANT CHANGE UP (ref 80–100)
MONOCYTES # BLD AUTO: 0.63 K/UL — SIGNIFICANT CHANGE UP (ref 0–0.9)
MONOCYTES NFR BLD AUTO: 6 % — SIGNIFICANT CHANGE UP (ref 2–14)
NEUTROPHILS # BLD AUTO: 8.49 K/UL — HIGH (ref 1.8–7.4)
NEUTROPHILS NFR BLD AUTO: 80.3 % — HIGH (ref 43–77)
NRBC # FLD: 0 K/UL — SIGNIFICANT CHANGE UP (ref 0–0)
PLATELET # BLD AUTO: 146 K/UL — LOW (ref 150–400)
PMV BLD: 10.5 FL — SIGNIFICANT CHANGE UP (ref 7–13)
RBC # BLD: 3.69 M/UL — LOW (ref 3.8–5.2)
RBC # FLD: 14.9 % — HIGH (ref 10.3–14.5)
T PALLIDUM AB TITR SER: NEGATIVE — SIGNIFICANT CHANGE UP
WBC # BLD: 10.56 K/UL — HIGH (ref 3.8–10.5)
WBC # FLD AUTO: 10.56 K/UL — HIGH (ref 3.8–10.5)

## 2020-11-02 RX ORDER — IBUPROFEN 200 MG
1 TABLET ORAL
Qty: 0 | Refills: 0 | DISCHARGE
Start: 2020-11-02

## 2020-11-02 RX ORDER — METHYLCELLULOSE 500 MG
1 TABLET ORAL
Qty: 0 | Refills: 0 | DISCHARGE

## 2020-11-02 RX ORDER — ACETAMINOPHEN 500 MG
3 TABLET ORAL
Qty: 0 | Refills: 0 | DISCHARGE
Start: 2020-11-02

## 2020-11-02 RX ORDER — DOCUSATE SODIUM 100 MG
1 CAPSULE ORAL
Qty: 0 | Refills: 0 | DISCHARGE

## 2020-11-02 RX ADMIN — Medication 30 MILLIGRAM(S): at 12:15

## 2020-11-02 RX ADMIN — Medication 1 TABLET(S): at 12:17

## 2020-11-02 RX ADMIN — Medication 30 MILLIGRAM(S): at 18:34

## 2020-11-02 RX ADMIN — Medication 30 MILLIGRAM(S): at 05:08

## 2020-11-02 RX ADMIN — Medication 975 MILLIGRAM(S): at 21:58

## 2020-11-02 RX ADMIN — Medication 975 MILLIGRAM(S): at 22:58

## 2020-11-02 RX ADMIN — Medication 30 MILLIGRAM(S): at 17:50

## 2020-11-02 RX ADMIN — SIMETHICONE 80 MILLIGRAM(S): 80 TABLET, CHEWABLE ORAL at 12:17

## 2020-11-02 RX ADMIN — Medication 30 MILLIGRAM(S): at 00:21

## 2020-11-02 RX ADMIN — Medication 30 MILLIGRAM(S): at 05:23

## 2020-11-02 RX ADMIN — HEPARIN SODIUM 5000 UNIT(S): 5000 INJECTION INTRAVENOUS; SUBCUTANEOUS at 17:50

## 2020-11-02 RX ADMIN — HEPARIN SODIUM 5000 UNIT(S): 5000 INJECTION INTRAVENOUS; SUBCUTANEOUS at 05:07

## 2020-11-02 RX ADMIN — Medication 30 MILLIGRAM(S): at 13:00

## 2020-11-02 RX ADMIN — SIMETHICONE 80 MILLIGRAM(S): 80 TABLET, CHEWABLE ORAL at 17:54

## 2020-11-02 RX ADMIN — Medication 325 MILLIGRAM(S): at 12:17

## 2020-11-02 RX ADMIN — Medication 30 MILLIGRAM(S): at 00:36

## 2020-11-02 NOTE — PROVIDER CONTACT NOTE (OTHER) - ACTION/TREATMENT ORDERED:
ortho vs done WDl, encourage po fluids as per DR Moser,will monitor.callbell withi reach,instruct pt to call for assisstance ,

## 2020-11-02 NOTE — DISCHARGE NOTE OB - CARE PROVIDER_API CALL
Kohanim, Behnam  OBSTETRICS AND GYNECOLOGY  [address], [address]  [place] [address]  Phone: [phone]  Fax: [phone]  Follow Up Time:

## 2020-11-02 NOTE — PROGRESS NOTE ADULT - SUBJECTIVE AND OBJECTIVE BOX
Day __1_ of Anesthesia Pain Management Service    SUBJECTIVE:  Pain Scale Score	At rest: 0___ 	With Activity: ___2 	[ ] Refer to charted pain scores    THERAPY:    s/p ___0.15____ mg PF morphine on __11/1____      MEDICATIONS  (STANDING):  acetaminophen   Tablet .. 975 milliGRAM(s) Oral <User Schedule>  diphtheria/tetanus/pertussis (acellular) Vaccine (ADAcel) 0.5 milliLiter(s) IntraMuscular once  ferrous    sulfate 325 milliGRAM(s) Oral daily  heparin   Injectable 5000 Unit(s) SubCutaneous every 12 hours  ibuprofen  Tablet. 600 milliGRAM(s) Oral every 6 hours  oxytocin Infusion 333.333 milliUNIT(s)/Min (1000 mL/Hr) IV Continuous <Continuous>  prenatal multivitamin 1 Tablet(s) Oral daily    MEDICATIONS  (PRN):  butorphanol Injectable 0.125 milliGRAM(s) IV Push every 6 hours PRN Pruritus  diphenhydrAMINE 25 milliGRAM(s) Oral every 6 hours PRN Itching  diphenhydrAMINE   Injectable 25 milliGRAM(s) IV Push every 4 hours PRN Pruritus  HYDROmorphone  Injectable 1 milliGRAM(s) IV Push every 3 hours PRN Severe Pain (7 - 10)  HYDROmorphone  Injectable 0.5 milliGRAM(s) IV Push every 3 hours PRN Moderate Pain (4 - 6)  lanolin Ointment 1 Application(s) Topical every 6 hours PRN Sore Nipples  magnesium hydroxide Suspension 30 milliLiter(s) Oral two times a day PRN Constipation  naloxone Injectable 0.1 milliGRAM(s) IV Push every 3 minutes PRN For ANY of the following changes in patient status:  A. RR LESS THAN 10 breaths per minute, B. Oxygen saturation LESS THAN 90%, C. Sedation score of 6  ondansetron Injectable 4 milliGRAM(s) IV Push every 6 hours PRN Nausea  oxyCODONE    IR 5 milliGRAM(s) Oral every 3 hours PRN Mild Pain (1 - 3)  oxyCODONE    IR 10 milliGRAM(s) Oral every 3 hours PRN Moderate Pain (4 - 6)  oxyCODONE    IR 5 milliGRAM(s) Oral every 3 hours PRN Moderate to Severe Pain (4-10)  oxyCODONE    IR 5 milliGRAM(s) Oral once PRN Moderate to Severe Pain (4-10)  simethicone 80 milliGRAM(s) Chew every 4 hours PRN Gas      OBJECTIVE:    Sedation Score:	[ x] Alert	[ ] Drowsy	[ ] Arousable	[ ] Asleep	[ ] Unresponsive    Side Effects:	[ x] None	[ ] Nausea	[ ] Vomiting	[ ] Pruritus  		  [ ] Weakness		[ ] Numbness	[ ] Other:    Vital Signs Last 24 Hrs  T(C): 36.7 (02 Nov 2020 17:10), Max: 36.9 (02 Nov 2020 05:00)  T(F): 98.1 (02 Nov 2020 17:10), Max: 98.4 (02 Nov 2020 05:00)  HR: 70 (02 Nov 2020 17:10) (70 - 93)  BP: 110/58 (02 Nov 2020 17:10) (91/55 - 122/59)  BP(mean): 73 (01 Nov 2020 20:30) (58 - 82)  RR: 18 (02 Nov 2020 17:10) (16 - 24)  SpO2: 100% (02 Nov 2020 17:10) (96% - 100%)    ASSESSMENT/ PLAN  [ x] Patient transitioned to prn analgesics  [x ] Pain management per primary service, pain service to sign off   [ ]Documentation and Verification of current medications     Comments:

## 2020-11-02 NOTE — DISCHARGE NOTE OB - PLAN OF CARE
full recovery s/p repeat csection +BTL ,encourage ambulation and breastfeeding,fup with ob gyn in one week s/p csection

## 2020-11-02 NOTE — DISCHARGE NOTE OB - CARE PLAN
Principal Discharge DX:	S/P  section  Goal:	full recovery  Assessment and plan of treatment:	s/p repeat csection +BTL ,encourage ambulation and breastfeeding,fup with ob gyn in one week  Secondary Diagnosis:	Rupture of membranes with delay of delivery  Goal:	s/p csection

## 2020-11-02 NOTE — DISCHARGE NOTE OB - MATERIALS PROVIDED
Tdap Vaccination (VIS Pub Date: 2012)/  Immunization Record/Breastfeeding Log/Vaccinations/[place]  Screening Program/Bottle Feeding Log/Shaken Baby Prevention Handout/Breastfeeding Guide and Packet

## 2020-11-02 NOTE — DISCHARGE NOTE OB - MEDICATION SUMMARY - MEDICATIONS TO TAKE
I will START or STAY ON the medications listed below when I get home from the hospital:    prenatal vitamin  -- 1 cap(s) by mouth once a day, LD 10/28  -- Indication: For S/P  section    preenatal fiber gummies  -- 3 gum by mouth once a day  -- Indication: For S/P  section    ibuprofen 600 mg oral tablet  -- 1 tab(s) by mouth every 6 hours, As Needed  -- Indication: For S/P  section    acetaminophen 325 mg oral tablet  -- 3 tab(s) by mouth , As Needed  -- Indication: For S/P  section    Prenatal Multivitamins with Folic Acid 1 mg oral tablet  -- 1 tab(s) by mouth once a day  -- Indication: For S/P  section

## 2020-11-02 NOTE — DISCHARGE NOTE OB - HOSPITAL COURSE
patient admitted in labor underwent repeat csection +BTL,uncomplicated PP course  dc home/fup with obgyn in one week

## 2020-11-02 NOTE — PROGRESS NOTE ADULT - SUBJECTIVE AND OBJECTIVE BOX
POD#1    S: 41yo POD#1 s/p rLTCS  + BTL. Her pain is well controlled. She is tolerating a regular diet and passing flatus. Denies N/V. Denies CP/SOB/lightheadedness/dizziness.   She is ambulating without difficulty. Has not passed flatus or BM yet      O:   Vital Signs Last 24 Hrs  T(C): 36.7 (02 Nov 2020 09:45), Max: 37.3 (01 Nov 2020 12:15)  T(F): 98 (02 Nov 2020 09:45), Max: 99.1 (01 Nov 2020 12:15)  HR: 89 (02 Nov 2020 09:45) (75 - 93)  BP: 101/50 (02 Nov 2020 09:45) (89/43 - 117/61)  BP(mean): 73 (01 Nov 2020 20:30) (53 - 82)  RR: 18 (02 Nov 2020 09:45) (13 - 24)  SpO2: 99% (02 Nov 2020 09:45) (96% - 100%)    Labs:  Blood type: A Positive  Rubella IgG: RPR: Negative                          10.1<L>   10.56<H> >-----------< 146<L>    ( 11-02 @ 05:30 )             31.8<L>                        12.8   9.31 >-----------< 168    ( 11-01 @ 13:23 )             40.3                  PE:  General: NAD  Abdomen: Mildly distended, appropriately tender, incision c/d/i.  Extremities: No erythema, no pitting edema    A/P: 41yo POD#1 s/p RLTCS+ BTL  Patient is stable and doing well post-operatively.    - Continue regular diet.  - Increase ambulation.  - Continue motrin, tylenol, oxycodone PRN for pain control.  -POSSIBLE DC HOME TOMORROW

## 2020-11-02 NOTE — DISCHARGE NOTE OB - PATIENT PORTAL LINK FT
You can access the FollowMyHealth Patient Portal offered by [facility] by registering at the following website: [email or web address]. By joining [facility]’s FollowMyHealth portal, you will also be able to view your health information using other applications (apps) compatible with our system.

## 2020-11-03 ENCOUNTER — APPOINTMENT (OUTPATIENT)
Dept: ANTEPARTUM | Facility: CLINIC | Age: 40
End: 2020-11-03

## 2020-11-03 VITALS
HEART RATE: 94 BPM | DIASTOLIC BLOOD PRESSURE: 63 MMHG | OXYGEN SATURATION: 100 % | RESPIRATION RATE: 17 BRPM | TEMPERATURE: 98 F | SYSTOLIC BLOOD PRESSURE: 107 MMHG

## 2020-11-03 RX ORDER — SENNA PLUS 8.6 MG/1
1 TABLET ORAL ONCE
Refills: 0 | Status: COMPLETED | OUTPATIENT
Start: 2020-11-03 | End: 2020-11-03

## 2020-11-03 RX ORDER — IBUPROFEN 200 MG
600 TABLET ORAL EVERY 6 HOURS
Refills: 0 | Status: DISCONTINUED | OUTPATIENT
Start: 2020-11-03 | End: 2020-11-03

## 2020-11-03 RX ORDER — TETANUS TOXOID, REDUCED DIPHTHERIA TOXOID AND ACELLULAR PERTUSSIS VACCINE, ADSORBED 5; 2.5; 8; 8; 2.5 [IU]/.5ML; [IU]/.5ML; UG/.5ML; UG/.5ML; UG/.5ML
0.5 SUSPENSION INTRAMUSCULAR ONCE
Refills: 0 | Status: COMPLETED | OUTPATIENT
Start: 2020-11-03 | End: 2020-11-03

## 2020-11-03 RX ADMIN — Medication 600 MILLIGRAM(S): at 05:17

## 2020-11-03 RX ADMIN — Medication 975 MILLIGRAM(S): at 09:43

## 2020-11-03 RX ADMIN — Medication 1 APPLICATION(S): at 09:43

## 2020-11-03 RX ADMIN — Medication 975 MILLIGRAM(S): at 16:27

## 2020-11-03 RX ADMIN — HEPARIN SODIUM 5000 UNIT(S): 5000 INJECTION INTRAVENOUS; SUBCUTANEOUS at 05:17

## 2020-11-03 RX ADMIN — SENNA PLUS 1 TABLET(S): 8.6 TABLET ORAL at 09:43

## 2020-11-03 RX ADMIN — Medication 600 MILLIGRAM(S): at 01:19

## 2020-11-03 RX ADMIN — Medication 325 MILLIGRAM(S): at 13:39

## 2020-11-03 RX ADMIN — Medication 600 MILLIGRAM(S): at 00:19

## 2020-11-03 RX ADMIN — Medication 1 TABLET(S): at 13:39

## 2020-11-03 RX ADMIN — TETANUS TOXOID, REDUCED DIPHTHERIA TOXOID AND ACELLULAR PERTUSSIS VACCINE, ADSORBED 0.5 MILLILITER(S): 5; 2.5; 8; 8; 2.5 SUSPENSION INTRAMUSCULAR at 05:33

## 2020-11-03 RX ADMIN — Medication 600 MILLIGRAM(S): at 06:17

## 2020-11-03 NOTE — PROGRESS NOTE ADULT - SUBJECTIVE AND OBJECTIVE BOX
SUBJECTIVE:    Pain: Controlled    Complaints: None    MILESTONES:    Alert and Oriented x 3  [ x ]  Out of bed/ ambulating. [ x ]  Flatus:   Positive [ x ]  Negative [  ]  Bowel movement  [  ] Positive [  ] Negative   Voiding [x  ] Due to void [  ]   Arredondo/Indwelling catheter in place [  ]  Diet: Regular [ x ]  Clears [  ]  NPO [  ]    Infant feeding:  Breast [  ]   Bottle [  ]  Both [ X ]  Feeding related issues and/or concerns:      OBJECTIVE:  T(C): 36.8 (20 @ 05:41), Max: 36.8 (20 @ 05:41)  HR: 73 (20 @ 05:41) (70 - 79)  BP: 106/58 (20 @ 05:41) (106/58 - 122/59)  RR: 18 (20 @ 05:41) (16 - 18)  SpO2: 100% (20 @ 05:41) (100% - 100%)  Wt(kg): --                        10.1   10.56 )-----------( 146      ( 2020 05:30 )             31.8           Blood Type: A Positive    RPR: Negative          MEDICATIONS  (STANDING):  acetaminophen   Tablet .. 975 milliGRAM(s) Oral <User Schedule>  ferrous    sulfate 325 milliGRAM(s) Oral daily  heparin   Injectable 5000 Unit(s) SubCutaneous every 12 hours  ibuprofen  Tablet. 600 milliGRAM(s) Oral every 6 hours  oxytocin Infusion 333.333 milliUNIT(s)/Min (1000 mL/Hr) IV Continuous <Continuous>  prenatal multivitamin 1 Tablet(s) Oral daily    MEDICATIONS  (PRN):  diphenhydrAMINE 25 milliGRAM(s) Oral every 6 hours PRN Itching  lanolin Ointment 1 Application(s) Topical every 6 hours PRN Sore Nipples  magnesium hydroxide Suspension 30 milliLiter(s) Oral two times a day PRN Constipation  oxyCODONE    IR 5 milliGRAM(s) Oral every 3 hours PRN Moderate to Severe Pain (4-10)  oxyCODONE    IR 5 milliGRAM(s) Oral once PRN Moderate to Severe Pain (4-10)  simethicone 80 milliGRAM(s) Chew every 4 hours PRN Gas        ASSESSMENT:    40y     G   4   P  4004       PO Day# 2         Delivery: Primary [  ]    Repeat [ X ]    With BTL    QBL - 327                                  Indication of procedure: Previous C/S with Ruptured Membranes    Condition: Stable    Past Medical History significant for: HPI:  39yo female  @ 38.4 wks SLIUP uncomp PNC here complaining of rupture of clear fluid at 10:30am. Pt reports GFM and denies ctx's. Pt is a previous  x 3 scheduled for a rpt  with BTL.    Pmhx-denies  Pshx/Jatk-f-linauhb x 3  Meds-PNV; dolcolax; citracil; fiber   NKDA  Gyn-fibroids as teen; PCOS  Past [id number] for distress 7#               3-9# rpt                2013-8# rpt   Soc-denies (2020 12:58)      Current Issues:    Breasts:  Soft [x  ]   Engorged [  ]  Nipples:  Abdomen: Soft [ x ]   Distended [  ] Nontender [  ]     Bowel sounds :  Present [  ]  Absent [  ]   Fundus:  Firm [x  ]  Boggy [  ]    Abdominal incision: Clean, Dry and Intact [x  ]  Staples [  ] Steri Strips [ X ] Dermabond [  ] Sutures [  ]    Patient wearing abdominal binder for support.    Vaginal: Lochia:  Heavy [  ]  Moderate [ x ]   Scant [  ]    Extremities: Edema [ X ] Negative Robb's Sign [ X ] Nontender Darren  [ x ] Positive pedal pulses [  ]    Other relevant physical exam findings:      PLAN:    Plan: Increase ambulation, analgesia PRN and pain medication protocol standing oxycodone, ibuprofen and acetaminophen.    Diet: Regular diet    Continue routine post-operative and postpartum care.     Discharge Planning [ x ]    For discharge Today  [  X  ]    Consults:  Social Work [  ]  Lactation [ x ]  Other [         ]

## 2020-11-03 NOTE — CHART NOTE - NSCHARTNOTEFT_GEN_A_CORE
Attending Note    Patient offers no complaints or acute events overnight. Tolerating regular diet. + flatus. Lochia light. No dysuria. Ambulating.  VS T 98.3  P 73 R 18  /58    heent nl  abd soft Incision C/D/I  ext no CCE  neuro AAox 3  postop H/H 10.1/31.8    A: POD#2 s/p RCS and BTL clinically stable  P: DC home     Discharge instructions given     MBeauvil

## 2020-11-20 DIAGNOSIS — O28.3 ABNORMAL ULTRASONIC FINDING ON ANTENATAL SCREENING OF MOTHER: ICD-10-CM

## 2020-11-20 DIAGNOSIS — O09.523 SUPERVISION OF ELDERLY MULTIGRAVIDA, THIRD TRIMESTER: ICD-10-CM

## 2020-11-20 DIAGNOSIS — O24.410 GESTATIONAL DIABETES MELLITUS IN PREGNANCY, DIET CONTROLLED: ICD-10-CM

## 2020-11-21 LAB — SURGICAL PATHOLOGY STUDY: SIGNIFICANT CHANGE UP

## 2023-08-16 NOTE — OB RN PATIENT PROFILE - HAS THE PATIENT RECEIVED THE INFLUENZA VACCINE THIS SEASON?
[facility] placed call to patient to arrange RPM kit  through [facility]. Reviewed with patient how to pack equipment in original packing. Verified patient's availability to schedule UPS  time. UPS  time requested.  Anticipated  date range 2 to 4 business days
yes...

## 2024-11-22 NOTE — OB RN PATIENT PROFILE - NSLDARRIVAL_OBGYN_ALL_OB_START_DATE
Endocrinologist    If having any new, persistent or worsening symptoms including but not limited to chest pain, shorness of breath, loss of consciousness, palpitations , chest or abdomen pressure, left arm pain or pressure, severe headache, especially with new weakness/numbness or tingling in any part of body, any gait or balance issues, any fever/ chill, cough, sputum,  buring with urine , any bleeding or dark stools etc  ,please return to nearest facility for evaluation    Please check fingerstick glucose before meals and at bedtime.  Maintain hypoglycemia protocol as per reading material provided. If fingerstick glucose is less than 150, please hold the Lantus dose.  Fingerstick glucose are less than 110s, may need to hold oral diabetic medicine.  If having hypoglycemia symptoms and fingerstick glucose readings are less than 100, you may need to take juices/glucose tablets until your blood sugar improves to > 100mg/dl and symptoms resolve . If having hypoglycemia and symptoms of drowsiness, or lethargic, you/patient may need glucagon injection to be administered as per written material or be brought to the Emergency Department for further management.  In case of glucose reading greater than 300s or less than 100s and having any symptoms, you/patient need to be evaluated by physician    Please go through Printed reading material and feel free to reach out in case of any queries, concerns or issues per contact provided    --------------------------------------------------------------------------------------------------------------------------------------------------------------------------------------------------------------------------------------------------------Tanmirta cuadra regilyèman epi fikse randevou swati douglas [place] ou ak andokrinològ talè.    Yariel cooperatoswald RAMIREZ, Tara douglas Phos epi diskite lopez rezilta khalif douglas D[place] ou ak 
01-Nov-2020 12:20